# Patient Record
Sex: MALE | Race: WHITE | NOT HISPANIC OR LATINO | Employment: OTHER | URBAN - METROPOLITAN AREA
[De-identification: names, ages, dates, MRNs, and addresses within clinical notes are randomized per-mention and may not be internally consistent; named-entity substitution may affect disease eponyms.]

---

## 2021-01-19 ENCOUNTER — TELEPHONE (OUTPATIENT)
Dept: SCHEDULING | Facility: IMAGING CENTER | Age: 81
End: 2021-01-19

## 2021-01-20 DIAGNOSIS — Z23 NEED FOR VACCINATION: ICD-10-CM

## 2021-01-27 ENCOUNTER — APPOINTMENT (OUTPATIENT)
Dept: FAMILY PLANNING/WOMEN'S HEALTH CLINIC | Facility: IMMUNIZATION CENTER | Age: 81
End: 2021-01-27
Attending: INTERNAL MEDICINE
Payer: MEDICARE

## 2021-01-27 DIAGNOSIS — Z23 NEED FOR VACCINATION: ICD-10-CM

## 2021-01-27 PROCEDURE — 0011A MODERNA SARS-COV-2 VACCINE: CPT | Performed by: INTERNAL MEDICINE

## 2021-01-27 PROCEDURE — 91301 MODERNA SARS-COV-2 VACCINE: CPT | Performed by: INTERNAL MEDICINE

## 2021-01-27 SDOH — ECONOMIC STABILITY: TRANSPORTATION INSECURITY
IN THE PAST 12 MONTHS, HAS THE LACK OF TRANSPORTATION KEPT YOU FROM MEDICAL APPOINTMENTS OR FROM GETTING MEDICATIONS?: NO

## 2021-01-27 SDOH — ECONOMIC STABILITY: INCOME INSECURITY: IN THE LAST 12 MONTHS, WAS THERE A TIME WHEN YOU WERE NOT ABLE TO PAY THE MORTGAGE OR RENT ON TIME?: NO

## 2021-01-27 SDOH — ECONOMIC STABILITY: HOUSING INSECURITY: IN THE LAST 12 MONTHS, HOW MANY PLACES HAVE YOU LIVED?: 1

## 2021-01-27 SDOH — ECONOMIC STABILITY: HOUSING INSECURITY
IN THE LAST 12 MONTHS, WAS THERE A TIME WHEN YOU DID NOT HAVE A STEADY PLACE TO SLEEP OR SLEPT IN A SHELTER (INCLUDING NOW)?: NO

## 2021-01-27 SDOH — HEALTH STABILITY: MENTAL HEALTH
STRESS IS WHEN SOMEONE FEELS TENSE, NERVOUS, ANXIOUS, OR CAN'T SLEEP AT NIGHT BECAUSE THEIR MIND IS TROUBLED. HOW STRESSED ARE YOU?: NOT AT ALL

## 2021-01-27 SDOH — HEALTH STABILITY: PHYSICAL HEALTH: ON AVERAGE, HOW MANY MINUTES DO YOU ENGAGE IN EXERCISE AT THIS LEVEL?: 30 MINUTES

## 2021-01-27 SDOH — ECONOMIC STABILITY: TRANSPORTATION INSECURITY
IN THE PAST 12 MONTHS, HAS LACK OF RELIABLE TRANSPORTATION KEPT YOU FROM MEDICAL APPOINTMENTS, MEETINGS, WORK OR FROM GETTING THINGS NEEDED FOR DAILY LIVING?: NO

## 2021-01-27 SDOH — HEALTH STABILITY: PHYSICAL HEALTH: ON AVERAGE, HOW MANY DAYS PER WEEK DO YOU ENGAGE IN MODERATE TO STRENUOUS EXERCISE (LIKE A BRISK WALK)?: 4 DAYS

## 2021-01-27 ASSESSMENT — SOCIAL DETERMINANTS OF HEALTH (SDOH)
HOW OFTEN DO YOU ATTEND CHURCH OR RELIGIOUS SERVICES?: NEVER
HOW OFTEN DO YOU ATTENT MEETINGS OF THE CLUB OR ORGANIZATION YOU BELONG TO?: MORE THAN 4 TIMES PER YEAR
WITHIN THE PAST 12 MONTHS, THE FOOD YOU BOUGHT JUST DIDN'T LAST AND YOU DIDN'T HAVE MONEY TO GET MORE: NEVER TRUE
HOW OFTEN DO YOU HAVE A DRINK CONTAINING ALCOHOL: 4 OR MORE TIMES A WEEK
IN A TYPICAL WEEK, HOW MANY TIMES DO YOU TALK ON THE PHONE WITH FAMILY, FRIENDS, OR NEIGHBORS?: THREE TIMES A WEEK
HOW OFTEN DO YOU HAVE SIX OR MORE DRINKS ON ONE OCCASION: NEVER
DO YOU BELONG TO ANY CLUBS OR ORGANIZATIONS SUCH AS CHURCH GROUPS UNIONS, FRATERNAL OR ATHLETIC GROUPS, OR SCHOOL GROUPS?: YES
HOW OFTEN DO YOU GET TOGETHER WITH FRIENDS OR RELATIVES?: THREE TIMES A WEEK
HOW MANY DRINKS CONTAINING ALCOHOL DO YOU HAVE ON A TYPICAL DAY WHEN YOU ARE DRINKING: 1 OR 2
WITHIN THE PAST 12 MONTHS, YOU WORRIED THAT YOUR FOOD WOULD RUN OUT BEFORE YOU GOT THE MONEY TO BUY MORE: NEVER TRUE

## 2021-01-28 ENCOUNTER — IMMUNIZATION (OUTPATIENT)
Dept: FAMILY PLANNING/WOMEN'S HEALTH CLINIC | Facility: IMMUNIZATION CENTER | Age: 81
End: 2021-01-28
Payer: MEDICARE

## 2021-01-28 DIAGNOSIS — Z23 ENCOUNTER FOR VACCINATION: Primary | ICD-10-CM

## 2021-02-03 ENCOUNTER — OFFICE VISIT (OUTPATIENT)
Dept: MEDICAL GROUP | Facility: PHYSICIAN GROUP | Age: 81
End: 2021-02-03
Payer: MEDICARE

## 2021-02-03 VITALS
DIASTOLIC BLOOD PRESSURE: 70 MMHG | TEMPERATURE: 98.6 F | OXYGEN SATURATION: 96 % | WEIGHT: 200.2 LBS | HEART RATE: 85 BPM | BODY MASS INDEX: 30.34 KG/M2 | SYSTOLIC BLOOD PRESSURE: 130 MMHG | RESPIRATION RATE: 16 BRPM | HEIGHT: 68 IN

## 2021-02-03 DIAGNOSIS — E78.5 HYPERLIPIDEMIA LDL GOAL <100: ICD-10-CM

## 2021-02-03 DIAGNOSIS — I48.91 ATRIAL FIBRILLATION, UNSPECIFIED TYPE (HCC): ICD-10-CM

## 2021-02-03 DIAGNOSIS — C61 MALIGNANT NEOPLASM OF PROSTATE (HCC): ICD-10-CM

## 2021-02-03 DIAGNOSIS — Z12.12 SCREENING FOR COLORECTAL CANCER: ICD-10-CM

## 2021-02-03 DIAGNOSIS — J67.2 PNEUMONITIS, HYPERSENSITIVITY, AVIAN (HCC): ICD-10-CM

## 2021-02-03 DIAGNOSIS — J84.9 ILD (INTERSTITIAL LUNG DISEASE) (HCC): ICD-10-CM

## 2021-02-03 DIAGNOSIS — Z00.00 ANNUAL PHYSICAL EXAM: ICD-10-CM

## 2021-02-03 DIAGNOSIS — I10 BENIGN ESSENTIAL HYPERTENSION: ICD-10-CM

## 2021-02-03 DIAGNOSIS — Z12.11 SCREENING FOR COLORECTAL CANCER: ICD-10-CM

## 2021-02-03 PROCEDURE — 99204 OFFICE O/P NEW MOD 45 MIN: CPT | Performed by: NURSE PRACTITIONER

## 2021-02-03 RX ORDER — HYDROCHLOROTHIAZIDE 25 MG/1
25 TABLET ORAL DAILY
COMMUNITY
Start: 2020-12-24

## 2021-02-03 RX ORDER — ACETAMINOPHEN 325 MG/1
325 TABLET ORAL
COMMUNITY

## 2021-02-03 RX ORDER — MYCOPHENOLATE MOFETIL 500 MG/1
TABLET ORAL
COMMUNITY
Start: 2020-08-31 | End: 2021-02-03

## 2021-02-03 RX ORDER — LOSARTAN POTASSIUM 100 MG/1
100 TABLET ORAL DAILY
COMMUNITY
Start: 2020-12-09

## 2021-02-03 RX ORDER — OMEPRAZOLE 20 MG/1
20 TABLET, DELAYED RELEASE ORAL DAILY
COMMUNITY
Start: 2020-08-31 | End: 2021-02-03

## 2021-02-03 RX ORDER — SIMVASTATIN 40 MG
40 TABLET ORAL
COMMUNITY
Start: 2020-12-27 | End: 2021-07-06

## 2021-02-03 RX ORDER — MYCOPHENOLATE MOFETIL 500 MG/1
TABLET ORAL
COMMUNITY
Start: 2020-12-27 | End: 2021-07-06

## 2021-02-03 ASSESSMENT — ENCOUNTER SYMPTOMS
MUSCULOSKELETAL NEGATIVE: 1
CLAUDICATION: 0
RESPIRATORY NEGATIVE: 1
SORE THROAT: 0
PSYCHIATRIC NEGATIVE: 1
NEUROLOGICAL NEGATIVE: 1
GASTROINTESTINAL NEGATIVE: 1
CONSTITUTIONAL NEGATIVE: 1
PALPITATIONS: 1
EYES NEGATIVE: 1

## 2021-02-03 ASSESSMENT — PATIENT HEALTH QUESTIONNAIRE - PHQ9: CLINICAL INTERPRETATION OF PHQ2 SCORE: 0

## 2021-02-03 NOTE — ASSESSMENT & PLAN NOTE
Chronic stable condition.  He currently is taking simvastatin 40 mg at every bedtime.  He denies any issues with myalgias.  He is being followed by Dr. Reese at [facility] in [place].

## 2021-02-03 NOTE — ASSESSMENT & PLAN NOTE
Chronic but stable.    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  He has since then been diagnosed with interstitial lung disease and has seen a pulmonologist in [facility].

## 2021-02-03 NOTE — ASSESSMENT & PLAN NOTE
This is a chronic stable patient.    Patient was diagnosed 6 years ago when he was moving his barbecue upstairs and he noticed that his heart beat came funky with his beats.  He then went to the hospital and was reported to be in A. fib they were able to get this under control with medications.  He denies any medications for 1 to 2 years then states he began having similar symptoms.    While he was living in [place] he was seen by cardiology where they attempted different medications however over time he has had side effects to certain one such as amiodarone.  They did an ablation in 2014 at [facility] in [place].  He denies any current issues such as shortness of breath, dizziness, palpitations.  He is currently using Eliquis at this time his cardiologist Dr. Reese at [facility] follows.

## 2021-02-03 NOTE — ASSESSMENT & PLAN NOTE
Follow with pulmonary at [facility] [[email or web address]   increaeed SBO with exhertino and started using portable o2 and concentrator at home. doesnot need it all the itme,   Maintains 90-93% onRA resting  Uses at night    Uses 3 liters out and about  At home 2 Lcn

## 2021-02-03 NOTE — PROGRESS NOTES
Chief Complaint   Patient presents with   • [facility] [place] [facility]      Subjective:     Pablo Kraus is a 80 y.o. male presenting to establish care and:    ILD (interstitial lung disease) (HCC)  This is a chronic stable condition.    Patient believes he was diagnosed back in 2000 by Dr. Anderson at the time of [facility].  He has been stable for many years however in the last year it seems to rapidly have progressed he was referred out to [facility] where he sees Toya Neal MD.   Will be following up with [facility] on March 1 for a chest CT and pulmonary function test.  He has had multiple imaging studies for this.    He recently in the last year was prescribed oxygen which he does currently use 2 to 3 L nasal cannula portable oxygen which she currently has done in the office.  He also has a concentrator at the house that he uses long tubing on.  At rest he normally is on room air 93% so he does not always wear his oxygen.    He is currently using mycophenolate daily which is being managed by his pulmonologist at [facility].    Malignant neoplasm of prostate (HCC)  This is resolved.    He had his prostate removed back in 2000 he does get occasional PSA checks.  He denies any current issues     Pneumonitis, hypersensitivity, sheela (HCC)  Chronic but stable.    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  He has since then been diagnosed with interstitial lung disease and has seen a pulmonologist in [facility].     Atrial fibrillation (HCC)  This is a chronic stable patient.    Patient was diagnosed 6 years ago when he was moving his barbecue upstairs and he noticed that his heart beat came funky with his beats.  He then went to the hospital and was reported to be in A. fib they were able to get this under control with medications.  He denies any medications for 1 to 2 years then states he began having similar  symptoms.    While he was living in [place] he was seen by cardiology where they attempted different medications however over time he has had side effects to certain one such as amiodarone.  They did an ablation in 2014 at [facility] in [place].  He denies any current issues such as shortness of breath, dizziness, palpitations.  He is currently using Eliquis at this time his cardiologist Dr. Reese at [facility] follows.       Benign essential hypertension  This is a chronic and stable condition.    He currently is on hydrochlorothiazide and losartan.  He originally was on amlodipine however that gave him side effects.  He is being followed by Dr. June cardiology at [facility] in [place].  Today's blood pressure was 130/70.  Patient denies any increase in headaches or dizziness, chest pain or chest palpitations.      Hyperlipidemia LDL goal <100  Chronic stable condition.  He currently is taking simvastatin 40 mg at every bedtime.  He denies any issues with myalgias.  He is being followed by Dr. Reese at [facility] in [place].        Review of Systems   Constitutional: Negative.    HENT: Positive for nosebleeds. Negative for congestion, ear pain and sore throat.         Occasional nose bleeds but using eliquis    Eyes: Negative.    Respiratory: Negative.         On home oxygen 2-3 liter nc protable O2   Cardiovascular: Positive for palpitations and leg swelling. Negative for chest pain and claudication.        Slight swelling but baseline  occasional palpations-baseline for his afib   Gastrointestinal: Negative.    Genitourinary: Negative.    Musculoskeletal: Negative.    Skin: Negative.    Neurological: Negative.    Psychiatric/Behavioral: Negative.         Review of systems:  See above.   Denies any symptoms unless previously indicated.        Current Outpatient Medications:   •  hydroCHLOROthiazide (HYDRODIURIL) 25 MG Tab, Take 25 mg by mouth every day.,  "Disp: , Rfl:   •  apixaban (ELIQUIS) 5mg Tab, Take 5 mg by mouth., Disp: , Rfl:   •  losartan (COZAAR) 100 MG Tab, Take 100 mg by mouth every day., Disp: , Rfl:   •  simvastatin (ZOCOR) 40 MG Tab, Take 40 mg by mouth every bedtime., Disp: , Rfl:   •  mycophenolate (CELLCEPT) 500 MG tablet, TAKE 1 TABLET BY MOUTH TWICE DAILY FOR 2 WEEKS THEN TAKE 2 TABLETS TWICE DAILY, Disp: , Rfl:   •  vitamin E 180 mg (400 units) Cap, Take 180 mg by mouth every day., Disp: , Rfl:   •  acetaminophen (TYLENOL) 325 MG Tab, Take 325 mg by mouth., Disp: , Rfl:     Past Medical History:   Diagnosis Date   • Other dyspnea and respiratory abnormality 12/16/2010       Past Surgical History:   Procedure Laterality Date   • APPENDECTOMY     • PROSTATECTOMY, RADICAL RETRO     • TONSILLECTOMY         History reviewed. No pertinent family history.    Patient has no known allergies.    Allergies, past medical history, past surgical history, family history, social history reviewed and updated    Objective:     Vitals: /70 (BP Location: Left arm, Patient Position: Sitting, BP Cuff Size: Adult)   Pulse 85   Temp 37 °C (98.6 °F) (Temporal)   Resp 16   Ht 1.727 m (5' 8\")   Wt 90.8 kg (200 lb 3.2 oz)   SpO2 96%   BMI 30.44 kg/m²   General: Alert, pleasant, NAD  EYES:   PERRL, EOMI, no icterus or pallor.  Conjunctivae and lids normal.   HENT:  Normocephalic.  External ears normal. Tympanic membranes pearly, opaque.  No nasal drainage present.  Oropharynx non-erythematous, mucous membranes moist.  Neck supple.   No thyromegaly or masses palpated. No cervical or supraclavicular lymphadenopathy.  Heart: Regular rate and rhythm.  S1 and S2 normal.  No murmurs appreciated.  Respiratory: On a portable oxygenation device at 3 L nasal cannula.  Normal respiratory effort.  Clear to auscultation bilaterally.  Abdomen: Non-distended, soft, non-tender, no guarding/rebound. Bowel sounds present.  No hepatosplenomegaly.  No hernias.  Skin: Warm, dry, no " rashes.  Musculoskeletal: Gait is normal.  Moves all extremities well.    Extremities: No cyanosis or clubbing noted.  1+ pitting edema to bilateral lower extremities.   Neurological: No tremors, sensation grossly intact, tone/strength normal, gait is normal, CN2-12 intact.  Psych:  Affect/mood is normal, judgement is good, memory is intact, grooming is appropriate.    Assessment/Plan:   1. Annual physical exam  This 80-year-old male who is establishing care today.  He does have multiple specialists.  He has a cardiologist up in the [place] at [facility], as well as a pulmonologist Dr. Anderson and another pulmonologist at [facility].     2. ILD (interstitial lung disease) (HCC)  He is being followed by Dr. Emanuel Kimble at [facility]  He will be seeing them for a PFT March 1.  3. Pneumonitis, hypersensitivity, sheela (HCC)    4. Atrial fibrillation, unspecified type (HCC)  Being seen by Dr. June at [facility] in [place].  5. Benign essential hypertension  6. Hyperlipidemia LDL goal <100      7. Malignant neoplasm of prostate (HCC)  Had a prostatectomy in 2000 per patient.  Occasionally gets PSAs did not request one today.    8. Screening for colorectal cancer  - REFERRAL TO GI FOR COLONOSCOPY  Even the patient has aged out of this he is requesting to get another colonoscopy.    Discussed with patient possible alternative diagnoses.       Reviewed risks and benefits of treatment plan. Patient verbalizes understanding of all instruction and verbally agrees to plan.      Return in about 3 months (around 5/3/2021) for follow up on colonoscopy.    My total time spent caring for the patient on the day of the encounter was 50 minutes.   This does not include time spent on separately billable procedures/tests.    Please note that this dictation was created using voice recognition software. I have made every reasonable attempt to correct obvious errors, but I expect that there are errors of grammar and possibly content that  I did not discover before finalizing the note.

## 2021-02-03 NOTE — PATIENT INSTRUCTIONS
1.  Orders for colonoscopy.  If you do not hear from them in the next 3-5 business days please reach out to me through [facility].

## 2021-02-03 NOTE — ASSESSMENT & PLAN NOTE
This is a chronic and stable condition.    He currently is on hydrochlorothiazide and losartan.  He originally was on amlodipine however that gave him side effects.  He is being followed by Dr. June cardiology at [facility] in [place].  Today's blood pressure was 130/70.  Patient denies any increase in headaches or dizziness, chest pain or chest palpitations.

## 2021-02-03 NOTE — ASSESSMENT & PLAN NOTE
This is resolved.    He had his prostate removed back in 2000 he does get occasional PSA checks.  He denies any current issues

## 2021-02-03 NOTE — ASSESSMENT & PLAN NOTE
This is a chronic stable condition.    Patient believes he was diagnosed back in 2000 by Dr. Anderson at the time of [facility].  He has been stable for many years however in the last year it seems to rapidly have progressed he was referred out to [facility] where he sees Toya Neal MD.   Will be following up with [facility] on March 1 for a chest CT and pulmonary function test.  He has had multiple imaging studies for this.    He recently in the last year was prescribed oxygen which he does currently use 2 to 3 L nasal cannula portable oxygen which she currently has done in the office.  He also has a concentrator at the house that he uses long tubing on.  At rest he normally is on room air 93% so he does not always wear his oxygen.    He is currently using mycophenolate daily which is being managed by his pulmonologist at [facility].

## 2021-02-25 ENCOUNTER — IMMUNIZATION (OUTPATIENT)
Dept: FAMILY PLANNING/WOMEN'S HEALTH CLINIC | Facility: IMMUNIZATION CENTER | Age: 81
End: 2021-02-25
Attending: INTERNAL MEDICINE
Payer: MEDICARE

## 2021-02-25 DIAGNOSIS — Z23 ENCOUNTER FOR VACCINATION: Primary | ICD-10-CM

## 2021-02-25 PROCEDURE — 91301 MODERNA SARS-COV-2 VACCINE: CPT | Performed by: INTERNAL MEDICINE

## 2021-02-25 PROCEDURE — 0012A MODERNA SARS-COV-2 VACCINE: CPT | Performed by: INTERNAL MEDICINE

## 2021-03-30 ENCOUNTER — HOSPITAL ENCOUNTER (OUTPATIENT)
Dept: LAB | Facility: MEDICAL CENTER | Age: 81
End: 2021-03-30
Attending: PHYSICIAN ASSISTANT
Payer: MEDICARE

## 2021-03-30 LAB
ANION GAP SERPL CALC-SCNC: 8 MMOL/L (ref 7–16)
BASOPHILS # BLD AUTO: 0.6 % (ref 0–1.8)
BASOPHILS # BLD: 0.04 K/UL (ref 0–0.12)
BUN SERPL-MCNC: 16 MG/DL (ref 8–22)
CALCIUM SERPL-MCNC: 9.6 MG/DL (ref 8.4–10.2)
CHLORIDE SERPL-SCNC: 85 MMOL/L (ref 96–112)
CO2 SERPL-SCNC: 30 MMOL/L (ref 20–33)
CREAT SERPL-MCNC: 0.85 MG/DL (ref 0.5–1.4)
EOSINOPHIL # BLD AUTO: 0.07 K/UL (ref 0–0.51)
EOSINOPHIL NFR BLD: 1 % (ref 0–6.9)
ERYTHROCYTE [DISTWIDTH] IN BLOOD BY AUTOMATED COUNT: 41.4 FL (ref 35.9–50)
GLUCOSE SERPL-MCNC: 145 MG/DL (ref 65–99)
HCT VFR BLD AUTO: 43.2 % (ref 42–52)
HGB BLD-MCNC: 14.4 G/DL (ref 14–18)
IMM GRANULOCYTES # BLD AUTO: 0.03 K/UL (ref 0–0.11)
IMM GRANULOCYTES NFR BLD AUTO: 0.4 % (ref 0–0.9)
INR PPP: 1.09 (ref 0.87–1.13)
LYMPHOCYTES # BLD AUTO: 1 K/UL (ref 1–4.8)
LYMPHOCYTES NFR BLD: 14.1 % (ref 22–41)
MCH RBC QN AUTO: 30.5 PG (ref 27–33)
MCHC RBC AUTO-ENTMCNC: 33.3 G/DL (ref 33.7–35.3)
MCV RBC AUTO: 91.5 FL (ref 81.4–97.8)
MONOCYTES # BLD AUTO: 0.59 K/UL (ref 0–0.85)
MONOCYTES NFR BLD AUTO: 8.3 % (ref 0–13.4)
NEUTROPHILS # BLD AUTO: 5.37 K/UL (ref 1.82–7.42)
NEUTROPHILS NFR BLD: 75.6 % (ref 44–72)
NRBC # BLD AUTO: 0 K/UL
NRBC BLD-RTO: 0 /100 WBC
PLATELET # BLD AUTO: 215 K/UL (ref 164–446)
PMV BLD AUTO: 9.5 FL (ref 9–12.9)
POTASSIUM SERPL-SCNC: 4.4 MMOL/L (ref 3.6–5.5)
PROTHROMBIN TIME: 13.8 SEC (ref 12–14.6)
RBC # BLD AUTO: 4.72 M/UL (ref 4.7–6.1)
SODIUM SERPL-SCNC: 123 MMOL/L (ref 135–145)
WBC # BLD AUTO: 7.1 K/UL (ref 4.8–10.8)

## 2021-03-30 PROCEDURE — 85610 PROTHROMBIN TIME: CPT | Mod: GA

## 2021-03-30 PROCEDURE — 80048 BASIC METABOLIC PNL TOTAL CA: CPT

## 2021-03-30 PROCEDURE — 85025 COMPLETE CBC W/AUTO DIFF WBC: CPT

## 2021-03-30 PROCEDURE — 36415 COLL VENOUS BLD VENIPUNCTURE: CPT

## 2021-04-20 ENCOUNTER — PRE-ADMISSION TESTING (OUTPATIENT)
Dept: ADMISSIONS | Facility: MEDICAL CENTER | Age: 81
End: 2021-04-20
Attending: INTERNAL MEDICINE
Payer: MEDICARE

## 2021-04-20 DIAGNOSIS — Z01.810 PRE-OPERATIVE CARDIOVASCULAR EXAMINATION: ICD-10-CM

## 2021-04-20 DIAGNOSIS — Z01.812 PRE-OPERATIVE LABORATORY EXAMINATION: ICD-10-CM

## 2021-04-20 LAB
ANION GAP SERPL CALC-SCNC: 10 MMOL/L (ref 7–16)
BUN SERPL-MCNC: 14 MG/DL (ref 8–22)
CALCIUM SERPL-MCNC: 10 MG/DL (ref 8.5–10.5)
CHLORIDE SERPL-SCNC: 86 MMOL/L (ref 96–112)
CO2 SERPL-SCNC: 33 MMOL/L (ref 20–33)
CREAT SERPL-MCNC: 0.74 MG/DL (ref 0.5–1.4)
EKG IMPRESSION: NORMAL
GLUCOSE SERPL-MCNC: 85 MG/DL (ref 65–99)
POTASSIUM SERPL-SCNC: 4.4 MMOL/L (ref 3.6–5.5)
SARS-COV-2 RNA RESP QL NAA+PROBE: NOTDETECTED
SODIUM SERPL-SCNC: 129 MMOL/L (ref 135–145)
SPECIMEN SOURCE: NORMAL

## 2021-04-20 PROCEDURE — 36415 COLL VENOUS BLD VENIPUNCTURE: CPT

## 2021-04-20 PROCEDURE — 93010 ELECTROCARDIOGRAM REPORT: CPT | Performed by: INTERNAL MEDICINE

## 2021-04-20 PROCEDURE — U0005 INFEC AGEN DETEC AMPLI PROBE: HCPCS

## 2021-04-20 PROCEDURE — C9803 HOPD COVID-19 SPEC COLLECT: HCPCS

## 2021-04-20 PROCEDURE — 80048 BASIC METABOLIC PNL TOTAL CA: CPT

## 2021-04-20 PROCEDURE — 93005 ELECTROCARDIOGRAM TRACING: CPT

## 2021-04-20 PROCEDURE — U0003 INFECTIOUS AGENT DETECTION BY NUCLEIC ACID (DNA OR RNA); SEVERE ACUTE RESPIRATORY SYNDROME CORONAVIRUS 2 (SARS-COV-2) (CORONAVIRUS DISEASE [COVID-19]), AMPLIFIED PROBE TECHNIQUE, MAKING USE OF HIGH THROUGHPUT TECHNOLOGIES AS DESCRIBED BY CMS-2020-01-R: HCPCS

## 2021-04-27 ENCOUNTER — ANESTHESIA (OUTPATIENT)
Dept: SURGERY | Facility: MEDICAL CENTER | Age: 81
End: 2021-04-27
Payer: MEDICARE

## 2021-04-27 ENCOUNTER — ANESTHESIA EVENT (OUTPATIENT)
Dept: SURGERY | Facility: MEDICAL CENTER | Age: 81
End: 2021-04-27
Payer: MEDICARE

## 2021-04-27 ENCOUNTER — HOSPITAL ENCOUNTER (OUTPATIENT)
Facility: MEDICAL CENTER | Age: 81
End: 2021-04-27
Attending: INTERNAL MEDICINE | Admitting: INTERNAL MEDICINE
Payer: MEDICARE

## 2021-04-27 VITALS
SYSTOLIC BLOOD PRESSURE: 121 MMHG | RESPIRATION RATE: 16 BRPM | HEART RATE: 56 BPM | WEIGHT: 189.6 LBS | OXYGEN SATURATION: 97 % | DIASTOLIC BLOOD PRESSURE: 72 MMHG | TEMPERATURE: 97 F | BODY MASS INDEX: 28.73 KG/M2 | HEIGHT: 68 IN

## 2021-04-27 LAB — PATHOLOGY CONSULT NOTE: NORMAL

## 2021-04-27 PROCEDURE — 700105 HCHG RX REV CODE 258: Performed by: INTERNAL MEDICINE

## 2021-04-27 PROCEDURE — 160002 HCHG RECOVERY MINUTES (STAT): Performed by: INTERNAL MEDICINE

## 2021-04-27 PROCEDURE — 160025 RECOVERY II MINUTES (STATS): Performed by: INTERNAL MEDICINE

## 2021-04-27 PROCEDURE — 160035 HCHG PACU - 1ST 60 MINS PHASE I: Performed by: INTERNAL MEDICINE

## 2021-04-27 PROCEDURE — 160203 HCHG ENDO MINUTES - 1ST 30 MINS LEVEL 4: Performed by: INTERNAL MEDICINE

## 2021-04-27 PROCEDURE — 501629 HCHG TUBE, LUKI TRAP STERILE DISP: Performed by: INTERNAL MEDICINE

## 2021-04-27 PROCEDURE — 160046 HCHG PACU - 1ST 60 MINS PHASE II: Performed by: INTERNAL MEDICINE

## 2021-04-27 PROCEDURE — 160009 HCHG ANES TIME/MIN: Performed by: INTERNAL MEDICINE

## 2021-04-27 PROCEDURE — 700101 HCHG RX REV CODE 250: Performed by: INTERNAL MEDICINE

## 2021-04-27 PROCEDURE — 88305 TISSUE EXAM BY PATHOLOGIST: CPT

## 2021-04-27 PROCEDURE — A9270 NON-COVERED ITEM OR SERVICE: HCPCS | Performed by: INTERNAL MEDICINE

## 2021-04-27 PROCEDURE — 160048 HCHG OR STATISTICAL LEVEL 1-5: Performed by: INTERNAL MEDICINE

## 2021-04-27 PROCEDURE — 700111 HCHG RX REV CODE 636 W/ 250 OVERRIDE (IP): Performed by: ANESTHESIOLOGY

## 2021-04-27 RX ORDER — ONDANSETRON 2 MG/ML
4 INJECTION INTRAMUSCULAR; INTRAVENOUS
Status: DISCONTINUED | OUTPATIENT
Start: 2021-04-27 | End: 2021-04-27 | Stop reason: HOSPADM

## 2021-04-27 RX ORDER — DIPHENHYDRAMINE HYDROCHLORIDE 50 MG/ML
12.5 INJECTION INTRAMUSCULAR; INTRAVENOUS
Status: DISCONTINUED | OUTPATIENT
Start: 2021-04-27 | End: 2021-04-27 | Stop reason: HOSPADM

## 2021-04-27 RX ORDER — SODIUM CHLORIDE, SODIUM LACTATE, POTASSIUM CHLORIDE, CALCIUM CHLORIDE 600; 310; 30; 20 MG/100ML; MG/100ML; MG/100ML; MG/100ML
INJECTION, SOLUTION INTRAVENOUS CONTINUOUS
Status: DISCONTINUED | OUTPATIENT
Start: 2021-04-27 | End: 2021-04-27 | Stop reason: HOSPADM

## 2021-04-27 RX ORDER — FUROSEMIDE 20 MG/1
20 TABLET ORAL DAILY
COMMUNITY
End: 2021-07-06

## 2021-04-27 RX ORDER — POTASSIUM CHLORIDE 1.5 G/1.58G
10 POWDER, FOR SOLUTION ORAL DAILY
COMMUNITY
End: 2021-07-06

## 2021-04-27 RX ORDER — HALOPERIDOL 5 MG/ML
1 INJECTION INTRAMUSCULAR
Status: DISCONTINUED | OUTPATIENT
Start: 2021-04-27 | End: 2021-04-27 | Stop reason: HOSPADM

## 2021-04-27 RX ADMIN — SODIUM CHLORIDE, POTASSIUM CHLORIDE, SODIUM LACTATE AND CALCIUM CHLORIDE: 600; 310; 30; 20 INJECTION, SOLUTION INTRAVENOUS at 11:42

## 2021-04-27 RX ADMIN — PROPOFOL 100 MG: 10 INJECTION, EMULSION INTRAVENOUS at 11:49

## 2021-04-27 RX ADMIN — POVIDONE IODINE 15 ML: 100 SOLUTION TOPICAL at 11:02

## 2021-04-27 RX ADMIN — PROPOFOL 20 MG: 10 INJECTION, EMULSION INTRAVENOUS at 11:53

## 2021-04-27 ASSESSMENT — PAIN DESCRIPTION - PAIN TYPE: TYPE: SURGICAL PAIN

## 2021-04-27 ASSESSMENT — PAIN SCALES - GENERAL: PAIN_LEVEL: 0

## 2021-04-27 NOTE — OR NURSING
1240 Received pt from Rhoda CARTWRIGHT, assumed care of pt at this time. Pt sitting in recliner chair. Pt on continuous O2, pt.'s tank and belongings brought to bedside.     1245 Pt.'s Wife brought to bedside, Dr. Negron spoke with pt and Wife, all questions answered.     1255 Pt meets criteria for discharge home, pt dressing with assistance from Wife. Hand-off to Pamela CARTWRIGHT.

## 2021-04-27 NOTE — ANESTHESIA POSTPROCEDURE EVALUATION
Patient: Pablo Kraus    Procedure Summary     Date: 04/27/21 Room / Location: [facility] ROOM 26 / SURGERY SAME DAY [facility]    Anesthesia Start: 1142 Anesthesia Stop: 1211    Procedure: COLONOSCOPY (N/A Anus) Diagnosis: (DIVERTICULOSIS,COLON POLYPS,HEMORRHOIDS)    Surgeons: Lalit Negron M.D. Responsible Provider: Tyree Mancini M.D.    Anesthesia Type: general ASA Status: 2          Final Anesthesia Type: general  Last vitals  BP   Blood Pressure : 151/98    Temp   36.8 °C (98.2 °F)    Pulse   85   Resp   18    SpO2   96 %      Anesthesia Post Evaluation    Patient location during evaluation: PACU  Patient participation: complete - patient participated  Level of consciousness: awake and alert  Pain score: 0    Airway patency: patent  Anesthetic complications: no  Cardiovascular status: hemodynamically stable  Respiratory status: acceptable  Hydration status: euvolemic    PONV: none          No complications documented.     Nurse Pain Score: 0 (NPRS)

## 2021-04-27 NOTE — PROCEDURES
DATE OF PROCEDURE:  04/27/2021     PROCEDURE PERFORMED:  Colonoscopy with cold snare polypectomy.     INDICATIONS:  History of colon polyps, surveillance/screening.     INSTRUMENT UTILIZED:  Olympus pediatric variable stiffness flexible forward   viewing colonoscope.     CONSENT:  RBA Discussion held.  Risks including but not be limited to,   bleeding, complications of sedation, and perforation were discussed.     ANESTHESIOLOGIST:  Sedation/anesthesia provided by Tyree Mancini MD of   anesthesiology.     TOLERANCE:  Excellent.     LAXATIVE PREPARATION:  MoviPrep.     QUALITY OF PREPARATION:  Good.     PATHOLOGY SPECIMENS:    A.  Transverse colon polyp.  B.  Ascending colon polyp.     COLONOSCOPE WITHDRAWAL TIME:  13 minutes.     PROCEDURAL DETAIL:  After adequate sedation, the procedure was begun.    External anal exam revealed small external hemorrhoids.  Digital rectal exam   revealed normal resting anal sphincter tone and no palpable rectal masses.    The instrument was advanced per the anal canal into the rectal vault.  The   instrument was carefully advanced to the cecum, which was identified by the   ileocecal valve and appendiceal orifice.  Photodocumentation was obtained.    Upon withdrawal of the colonoscope, the mucosa was carefully inspected to the   best of our ability.  In the ascending colon approximately 80 cm from the anal   verge, there was a 3 mm sessile polyp, which was removed using cold snare   technique.  In the transverse colon approximately 65 cm from the anal verge,   there was a 3 mm sessile polyp, which was removed using cold snare technique   and retrieved.  In the descending and sigmoid colon, there was moderate   diverticulosis without diverticulitis.  Once in the rectum, retroflexion was   completed, which revealed grade II nonbleeding internal hemorrhoids.     No immediate complications.     IMPRESSIONS AND FINDINGS:   1.  Colon polyps x2, status post removal and retrieval.  2.   Diverticular disease of the left colon.  3.  Hemorrhoids.     PLAN AND RECOMMENDATIONS:    1.  Observe for any adverse events from this procedure.  2.  I will contact the patient in 2 weeks with pathology results.  3.  Okay to resume Eliquis tonight.  4.  High fiber diet and avoid straining the stool.  5.  Future surveillance interval depends upon pathology findings.        ______________________________  MD ALTON SMITH/SARAH/CHELI    DD:  04/27/2021 12:40  DT:  04/27/2021 13:15    Job#:  [id number]    CC:CRISTIN Jane(User)  MD ISABELLE OCAMPO MD

## 2021-04-27 NOTE — DISCHARGE INSTRUCTIONS
ACTIVITY: Rest and take it easy for the first 24 hours.  A responsible adult is recommended to remain with you during that time.  It is normal to feel sleepy.  We encourage you to not do anything that requires balance, judgment or coordination.    MILD FLU-LIKE SYMPTOMS ARE NORMAL. YOU MAY EXPERIENCE GENERALIZED MUSCLE ACHES, THROAT IRRITATION, HEADACHE AND/OR SOME NAUSEA.    FOR 24 HOURS DO NOT:  Drive, operate machinery or run household appliances.  Drink beer or alcoholic beverages.   Make important decisions or sign legal documents.    SPECIAL INSTRUCTIONS: I will send a path letter in two weeks regarding the small polyps   Okay to resume eliquis tonight   High fiber diet  Colonoscopy, Adult, Care After  This sheet gives you information about how to care for yourself after your procedure. Your doctor may also give you more specific instructions. If you have problems or questions, call your doctor.  What can I expect after the procedure?  After the procedure, it is common to have:  · A small amount of blood in your poop for 24 hours.  · Some gas.  · Mild cramping or bloating in your belly.  Follow these instructions at home:  General instructions  · For the first 24 hours after the procedure:  ? Do not drive or use machinery.  ? Do not sign important documents.  ? Do not drink alcohol.  ? Do your daily activities more slowly than normal.  ? Eat foods that are soft and easy to digest.  · Take over-the-counter or prescription medicines only as told by your doctor.  To help cramping and bloating:    · Try walking around.  · Put heat on your belly (abdomen) as told by your doctor. Use a heat source that your doctor recommends, such as a moist heat pack or a heating pad.  ? Put a towel between your skin and the heat source.  ? Leave the heat on for 20-30 minutes.  ? Remove the heat if your skin turns bright red. This is especially important if you cannot feel pain, heat, or cold. You can get burned.  Eating and  drinking    · Drink enough fluid to keep your pee (urine) clear or pale yellow.  · Return to your normal diet as told by your doctor. Avoid heavy or fried foods that are hard to digest.  · Avoid drinking alcohol for as long as told by your doctor.  Contact a doctor if:  · You have blood in your poop (stool) 2-3 days after the procedure.  Get help right away if:  · You have more than a small amount of blood in your poop.  · You see large clumps of tissue (blood clots) in your poop.  · Your belly is swollen.  · You feel sick to your stomach (nauseous).  · You throw up (vomit).  · You have a fever.  · You have belly pain that gets worse, and medicine does not help your pain.  Summary  · After the procedure, it is common to have a small amount of blood in your poop. You may also have mild cramping and bloating in your belly.  · For the first 24 hours after the procedure, do not drive or use machinery, do not sign important documents, and do not drink alcohol.  · Get help right away if you have a lot of blood in your poop, feel sick to your stomach, have a fever, or have more belly pain.  This information is not intended to replace advice given to you by your health care provider. Make sure you discuss any questions you have with your health care provider.  Document Released: 01/20/2012 Document Revised: 10/18/2018 Document Reviewed: 09/11/2017  [employer] Patient Education © 2020 [employer]      DIET: To avoid nausea, slowly advance diet as tolerated, avoiding spicy or greasy foods for the first day.  Add more substantial food to your diet according to your physician's instructions.  Babies can be fed formula or breast milk as soon as they are hungry.  INCREASE FLUIDS AND FIBER TO AVOID CONSTIPATION.    FOLLOW-UP APPOINTMENT:  A follow-up appointment should be arranged with your doctor in, call to schedule.    You should CALL YOUR PHYSICIAN if you develop:  Fever greater than 101 degrees F.  Pain not relieved by  medication, or persistent nausea or vomiting.  Excessive bleeding (blood soaking through dressing) or unexpected drainage from the wound.  Extreme redness or swelling around the incision site, drainage of pus or foul smelling drainage.  Inability to urinate or empty your bladder within 8 hours.  Problems with breathing or chest pain.    You should call 911 if you develop problems with breathing or chest pain.  If you are unable to contact your doctor or surgical center, you should go to the nearest emergency room or urgent care center.  Physician's telephone #: Dr. Negron [phone]    If any questions arise, call your doctor.  If your doctor is not available, please feel free to call the Surgical Center at [phone]. The Contact Center is open Monday through Friday 7AM to 5PM and may speak to a nurse at [phone], or toll free at [phone].     A registered nurse may call you a few days after your surgery to see how you are doing after your procedure.    MEDICATIONS: Resume taking daily medication.  Take prescribed pain medication with food.  If no medication is prescribed, you may take non-aspirin pain medication if needed.  PAIN MEDICATION CAN BE VERY CONSTIPATING.  Take a stool softener or laxative such as senokot, pericolace, or milk of magnesia if needed.    If your physician has prescribed pain medication that includes Acetaminophen (Tylenol), do not take additional Acetaminophen (Tylenol) while taking the prescribed medication.    Depression / Suicide Risk    As you are discharged from this [facility] facility, it is important to learn how to keep safe from harming yourself.    Recognize the warning signs:  · Abrupt changes in personality, positive or negative- including increase in energy   · Giving away possessions  · Change in eating patterns- significant weight changes-  positive or negative  · Change in sleeping patterns- unable to sleep or sleeping all the time   · Unwillingness or  inability to communicate  · Depression  · Unusual sadness, discouragement and loneliness  · Talk of wanting to die  · Neglect of personal appearance   · Rebelliousness- reckless behavior  · Withdrawal from people/activities they love  · Confusion- inability to concentrate     If you or a loved one observes any of these behaviors or has concerns about self-harm, here's what you can do:  · Talk about it- your feelings and reasons for harming yourself  · Remove any means that you might use to hurt yourself (examples: pills, rope, extension cords, firearm)  · Get professional help from the community (Mental Health, Substance Abuse, psychological counseling)  · Do not be alone:Call your Safe Contact- someone whom you trust who will be there for you.  · Call your local CRISIS HOTLINE 542-9220 or 900-502-6335  · Call your local Children's Mobile Crisis Response Team [place] (520) 852-9221 or www."[employer]"  · Call the toll free National Suicide Prevention Hotlines   · National Suicide Prevention Lifeline 554-688-BRWH (8372)  · National Hope Line Network 800-SUICIDE (609-8445)

## 2021-04-27 NOTE — OR SURGEON
Immediate Post OP Note    PreOp Diagnosis: history of colon polyps      PostOp Diagnosis: Colon polyps x 2, diverticular disease of colon, internal hemorrhoids       Procedure(s):  COLONOSCOPY - Wound Class: Clean Contaminated    Surgeon(s):  Lalit Negron M.D.    Anesthesiologist/Type of Anesthesia:  Anesthesiologist: Tyree Mancini M.D./General    Surgical Staff:  Endoscopy Technician: Edelmira Royal  Endoscopy Nurse: Kya Lundy R.N.; Nany Vidales; Yaniv Frankel RMARTIR    Specimens removed if any:  ID Type Source Tests Collected by Time Destination   A :  Polyp Colon - Transverse PATHOLOGY SPECIMEN Lalit Negron M.D. 4/27/2021 11:53 AM    B :  Polyp Colon - Ascending PATHOLOGY SPECIMEN Lalit Negron M.D. 4/27/2021 12:01 PM        Estimated Blood Loss: < 5 cc    Findings: Two small sessile polyps removed using cold snare technique, diverticular disease of left colon, internal hemorrhoids    Complications: no immediate    Plan/Recommendations:  1)see dictated note for details  2)I will contact patient with path in one to two weeks  3)Okay to resume eliquis tonight.        4/27/2021 12:15 PM Lalit Negron M.D.

## 2021-04-27 NOTE — ANESTHESIA PREPROCEDURE EVALUATION
Relevant Problems   CARDIAC   (+) Atrial fibrillation (HCC)   (+) Benign essential hypertension       Physical Exam    Airway   Mallampati: II  TM distance: >3 FB  Neck ROM: full       Cardiovascular - normal exam  Rhythm: irregular  Rate: normal  (-) murmur     Dental - normal exam           Pulmonary - normal exam  Breath sounds clear to auscultation     Abdominal    Neurological - normal exam                 Anesthesia Plan    ASA 2       Plan - general       Airway plan will be natural airway        Plan Factors:   Patient was previously instructed to abstain from smoking on day of procedure.  Patient did not smoke on day of procedure.      Induction: intravenous    Postoperative Plan: Postoperative administration of opioids is intended.    Pertinent diagnostic labs and testing reviewed    Informed Consent:    Anesthetic plan and risks discussed with patient.    Use of blood products discussed with: patient whom consented to blood products.

## 2021-04-27 NOTE — ANESTHESIA TIME REPORT
Anesthesia Start and Stop Event Times     Date Time Event    4/27/2021 1135 Ready for Procedure     1142 Anesthesia Start     1211 Anesthesia Stop        Responsible Staff  04/27/21    Name Role Begin End    Tyree Mancini M.D. Anesth 1142 1211        Preop Diagnosis (Free Text):  Pre-op Diagnosis     PERSONAL HISTORY OF COLONIC POLYPS        Preop Diagnosis (Codes):    Post op Diagnosis  Encounter for colonoscopy due to history of adenomatous colonic polyps      Premium Reason  Non-Premium    Comments:

## 2021-07-06 ENCOUNTER — TELEMEDICINE (OUTPATIENT)
Dept: MEDICAL GROUP | Facility: PHYSICIAN GROUP | Age: 81
End: 2021-07-06
Payer: MEDICARE

## 2021-07-06 VITALS
BODY MASS INDEX: 28.04 KG/M2 | HEIGHT: 68 IN | DIASTOLIC BLOOD PRESSURE: 78 MMHG | SYSTOLIC BLOOD PRESSURE: 118 MMHG | WEIGHT: 185 LBS

## 2021-07-06 DIAGNOSIS — Z99.81 ON HOME OXYGEN THERAPY: ICD-10-CM

## 2021-07-06 DIAGNOSIS — K64.9 HEMORRHOIDS, UNSPECIFIED HEMORRHOID TYPE: ICD-10-CM

## 2021-07-06 DIAGNOSIS — I27.20 PULMONARY HYPERTENSION (HCC): ICD-10-CM

## 2021-07-06 DIAGNOSIS — E87.1 HYPONATREMIA: ICD-10-CM

## 2021-07-06 DIAGNOSIS — J84.9 ILD (INTERSTITIAL LUNG DISEASE) (HCC): ICD-10-CM

## 2021-07-06 PROBLEM — R93.1 ABNORMAL ECHOCARDIOGRAM: Status: ACTIVE | Noted: 2021-03-09

## 2021-07-06 PROCEDURE — 99214 OFFICE O/P EST MOD 30 MIN: CPT | Mod: 95 | Performed by: NURSE PRACTITIONER

## 2021-07-06 RX ORDER — SODIUM CHLORIDE 0.9 % (FLUSH) 0.9 %
20 SYRINGE (ML) INJECTION
COMMUNITY
Start: 2021-03-03 | End: 2021-07-20

## 2021-07-06 RX ORDER — TREPROSTINIL 1.74 MG/2.9ML
INHALANT ORAL
COMMUNITY
Start: 2021-06-11

## 2021-07-06 RX ORDER — POTASSIUM CHLORIDE 750 MG/1
10 TABLET, EXTENDED RELEASE ORAL
COMMUNITY
Start: 2021-06-17 | End: 2021-07-06

## 2021-07-06 RX ORDER — POTASSIUM CHLORIDE 750 MG/1
10 TABLET, FILM COATED, EXTENDED RELEASE ORAL
COMMUNITY
End: 2021-07-20

## 2021-07-06 RX ORDER — SIMVASTATIN 40 MG
40 TABLET ORAL
COMMUNITY
Start: 2021-04-21

## 2021-07-06 RX ORDER — FUROSEMIDE 20 MG/1
TABLET ORAL
COMMUNITY
Start: 2021-07-06

## 2021-07-06 RX ORDER — MYCOPHENOLATE MOFETIL 500 MG/1
TABLET ORAL
COMMUNITY
Start: 2021-06-22 | End: 2024-02-26

## 2021-07-06 RX ORDER — POTASSIUM CHLORIDE 750 MG/1
10 TABLET, EXTENDED RELEASE ORAL DAILY
COMMUNITY
Start: 2021-06-17 | End: 2024-02-20

## 2021-07-06 NOTE — PROGRESS NOTES
Virtual Visit: Established Patient   This visit was conducted via Zoom using secure and encrypted videoconferencing technology. The patient was in a private location in the state of [place].    The patient's identity was confirmed and verbal consent was obtained for this virtual visit.    Subjective:   CC:   Chief Complaint   Patient presents with   • Follow-Up     colonoscopy/general check up       Osmin Kraus is a 80 y.o. male presenting for evaluation and management of:     Osmin and his wife Divina are hoping to go on a cruise in 2023 for 3 months around the world.  They also just recently sold one of their homes in [place] and [place] him to breathe easier and possibly not need to use home oxygen.     Hemorrhoids  Found on colonoscopy   Occasional bleeding noted- bright nicolette blood    ILD (interstitial lung disease) (HCC)  Chronic and stable condition.    Follows [facility] and [facility] in [place], [place]  Denies any shortness of breath, difficulty breathing, lower extremity swelling    Hyponatremia  Chronic and stable condition.    Sodium was noted on 3/30/2021 to be 123 and the most recently it did increase up to 129   currently on 20 mg lasix daily   We will trend this  Results for OSMIN KRAUS (MRN [id number]) as of 7/6/2021 11:20   Ref. Range 3/30/2021 13:36 4/20/2021 11:26   Sodium Latest Ref Range: 135 - 145 mmol/L 123 (L) 129 (L)       On home oxygen therapy  Chronic and stable condition  Continues to use 3 L nasal cannula 24 hours a day   Was recently seen by pulmonologist at [facility]   States that the pulmonologist told him that they may be able to release him from their care given him doing so well.    Denies any current shortness of breath, difficulty breathing.    Pulmonary hypertension (HCC)  Chronic and stable condition.    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    He was then started on inhaled Tyvaso in May 2021.    He states that he does feel that he is improved on shortness of breath with this medication and continues to follow with the pulmonary hypertension team at [facility] he is taking 40 mg Lasix daily  Denies any current shortness of breath, difficulty breathing, lower extremity swelling.      ROS   Denies any recent fevers or chills. No nausea or vomiting. No chest pains or shortness of breath.     Allergies   Allergen Reactions   • Mixed Feathers Unspecified     Respiratory congestion/complications       Current medicines (including changes today)  Current Outpatient Medications   Medication Sig Dispense Refill   • simvastatin (ZOCOR) 40 MG Tab Take 40 mg by mouth.     • potassium chloride ER (KLOR-CON) 10 MEQ tablet Take 10 mEq by mouth.     • mycophenolate (CELLCEPT) 500 MG tablet TAKE 1 TABLET BY MOUTH TWICE DAILY FOR 2 WEEKS THEN TAKE 2 TABLETS TWICE DAILY     • furosemide (LASIX) 20 MG Tab Take 1 tablet by mouth once daily     • Treprostinil (TYVASO) 0.6 MG/ML Solution Inhale.     • normal saline flush 0.9 % Solution Infuse 20 mL into a venous catheter.     • potassium chloride SA (K-DUR) 10 MEQ Tab CR Take 10 mEq by mouth every day.     • Home Care Oxygen Inhale continuous. 3 LNC 24 hours day     • hydroCHLOROthiazide (HYDRODIURIL) 25 MG Tab Take 25 mg by mouth every day.     • apixaban (ELIQUIS) 5mg Tab Take 5 mg by mouth.     • losartan (COZAAR) 100 MG Tab Take 100 mg by mouth every day.     • vitamin E 180 mg (400 units) Cap Take 180 mg by mouth every day.     • acetaminophen (TYLENOL) 325 MG Tab Take 325 mg by mouth.       No current facility-administered medications for this visit.       Patient Active Problem List    Diagnosis Date Noted   • Hemorrhoids 07/06/2021   • Hyponatremia 07/06/2021   • On home oxygen therapy 07/06/2021   • Pulmonary hypertension (HCC) 06/04/2021   • Abnormal echocardiogram 03/09/2021   • ILD  "(interstitial lung disease) (HCC) 09/25/2017   • Pneumonitis, hypersensitivity, sheela (HCC) 09/28/2016   • Atrial fibrillation (HCC) 08/18/2012   • Hyperlipidemia LDL goal <100 08/18/2012   • Malignant neoplasm of prostate (HCC) 08/18/2012   • Atrial septal defect 12/16/2010   • Benign essential hypertension 12/22/2009       History reviewed. No pertinent family history.    He  has a past medical history of Arrhythmia, Blood clotting disorder (HCC), Breath shortness, Cancer (HCC) (2000), Hiatus hernia syndrome, High cholesterol, Hypertension, Other dyspnea and respiratory abnormality (12/16/2010), Oxygen dependent (04/20/2021), and Pneumonia.  He  has a past surgical history that includes appendectomy; prostatectomy, radical retro; tonsillectomy; other cardiac surgery (2016); and pr colonoscopy,diagnostic (N/A, 4/27/2021).    Reviewed colonoscopy 4/27/2021 and lab work from 4/2/2021     Objective:   /78   Ht 1.727 m (5' 8\")   Wt 83.9 kg (185 lb)   BMI 28.13 kg/m²     Physical Exam:  Constitutional: Alert, no distress, well-groomed.  Skin: No rashes in visible areas.  Eye: Round. Conjunctiva clear, lids normal. No icterus.   ENMT: Lips pink without lesions, good dentition, moist mucous membranes. Phonation normal.  Neck: No masses, no thyromegaly. Moves freely without pain.  Respiratory: Unlabored respiratory effort, no cough or audible wheeze  Psych: Alert and oriented x3, normal affect and mood.       Assessment and Plan:   The following treatment plan was discussed:     1. Hemorrhoids, unspecified hemorrhoid type  Noted on colonoscopy  Does not bother patient at this time  Did recommend to patient that if he would like to speak with GI to have them band that was a possibility    2. ILD (interstitial lung disease) ([facility])  Continue to follow with [facility] as well as Dr. Geronimo in [facility]  Continue with medication regimen  Continue with home oxygen therapy    3. Hyponatremia  - SODIUM,SERUM  We will " trend his sodium    4. On home oxygen therapy  Continue to utilize 2 to 3 L nasal cannula    5. Pulmonary hypertension (HCC)  Continue to follow with [facility] as well as Dr. Geronimo in [facility]    Follow-up: Return in about 2 weeks (around 7/20/2021) for AWV.

## 2021-07-06 NOTE — ASSESSMENT & PLAN NOTE
Chronic and stable condition.    Follows [facility] and [facility] in [place], [place]  Denies any shortness of breath, difficulty breathing, lower extremity swelling

## 2021-07-06 NOTE — ASSESSMENT & PLAN NOTE
Chronic and stable condition  Continues to use 3 L nasal cannula 24 hours a day   Was recently seen by pulmonologist at [facility]   States that the pulmonologist told him that they may be able to release him from their care given him doing so well.    Denies any current shortness of breath, difficulty breathing.

## 2021-07-06 NOTE — ASSESSMENT & PLAN NOTE
Chronic and stable condition.    Sodium was noted on 3/30/2021 to be 123 and the most recently it did increase up to 129   currently on 20 mg lasix daily   We will trend this  Results for OSMIN GONG (MRN [id number]) as of 7/6/2021 11:20   Ref. Range 3/30/2021 13:36 4/20/2021 11:26   Sodium Latest Ref Range: 135 - 145 mmol/L 123 (L) 129 (L)

## 2021-07-07 NOTE — ASSESSMENT & PLAN NOTE
Chronic and stable condition.    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    He was then started on inhaled Tyvaso in May 2021.    He states that he does feel that he is improved on shortness of breath with this medication and continues to follow with the pulmonary hypertension team at [facility] he is taking 40 mg Lasix daily  Denies any current shortness of breath, difficulty breathing, lower extremity swelling.

## 2021-07-12 ENCOUNTER — HOSPITAL ENCOUNTER (OUTPATIENT)
Dept: LAB | Facility: MEDICAL CENTER | Age: 81
End: 2021-07-12
Attending: NURSE PRACTITIONER
Payer: MEDICARE

## 2021-07-12 LAB — SODIUM SERPL-SCNC: 127 MMOL/L (ref 135–145)

## 2021-07-12 PROCEDURE — 36415 COLL VENOUS BLD VENIPUNCTURE: CPT

## 2021-07-12 PROCEDURE — 84295 ASSAY OF SERUM SODIUM: CPT

## 2021-07-20 ENCOUNTER — TELEMEDICINE (OUTPATIENT)
Dept: MEDICAL GROUP | Facility: PHYSICIAN GROUP | Age: 81
End: 2021-07-20
Payer: MEDICARE

## 2021-07-20 VITALS
BODY MASS INDEX: 28.04 KG/M2 | SYSTOLIC BLOOD PRESSURE: 118 MMHG | DIASTOLIC BLOOD PRESSURE: 72 MMHG | WEIGHT: 185 LBS | HEIGHT: 68 IN

## 2021-07-20 DIAGNOSIS — J84.9 ILD (INTERSTITIAL LUNG DISEASE) (HCC): ICD-10-CM

## 2021-07-20 DIAGNOSIS — E78.5 HYPERLIPIDEMIA LDL GOAL <100: ICD-10-CM

## 2021-07-20 DIAGNOSIS — Z99.81 ON HOME OXYGEN THERAPY: ICD-10-CM

## 2021-07-20 DIAGNOSIS — I27.20 PULMONARY HYPERTENSION (HCC): ICD-10-CM

## 2021-07-20 DIAGNOSIS — I48.91 ATRIAL FIBRILLATION, UNSPECIFIED TYPE (HCC): ICD-10-CM

## 2021-07-20 DIAGNOSIS — I10 BENIGN ESSENTIAL HYPERTENSION: ICD-10-CM

## 2021-07-20 DIAGNOSIS — Z00.00 ENCOUNTER FOR ANNUAL WELLNESS VISIT (AWV) IN MEDICARE PATIENT: ICD-10-CM

## 2021-07-20 DIAGNOSIS — J67.2 PNEUMONITIS, HYPERSENSITIVITY, AVIAN (HCC): ICD-10-CM

## 2021-07-20 PROBLEM — R93.1 ABNORMAL ECHOCARDIOGRAM: Status: RESOLVED | Noted: 2021-03-09 | Resolved: 2021-07-20

## 2021-07-20 PROCEDURE — G0439 PPPS, SUBSEQ VISIT: HCPCS | Mod: 95 | Performed by: NURSE PRACTITIONER

## 2021-07-20 ASSESSMENT — PATIENT HEALTH QUESTIONNAIRE - PHQ9: CLINICAL INTERPRETATION OF PHQ2 SCORE: 0

## 2021-07-20 ASSESSMENT — ENCOUNTER SYMPTOMS: GENERAL WELL-BEING: GOOD

## 2021-07-20 ASSESSMENT — ACTIVITIES OF DAILY LIVING (ADL): BATHING_REQUIRES_ASSISTANCE: 0

## 2021-07-20 NOTE — ASSESSMENT & PLAN NOTE
Chronic and stable condition.    Currently taking Treprostinil, Lasix as well as potassium supplements.

## 2021-07-20 NOTE — ASSESSMENT & PLAN NOTE
Chronic and stable condition.    Patient follows with pulmonologist at [facility] as well as Dr. Geronimo in [facility] discussed with patient that they plan to possibly graduate him at the next visit is that he is only seen Dr. Geronimo from pulmonology.    Currently using Cellcept and Treprostinil   Has next PFT in December

## 2021-07-20 NOTE — ASSESSMENT & PLAN NOTE
Chronic and stable condition.    Recently started on Treprostinil inhalation  Uses home O2 3L NC  Currently on his home oxygen during visit

## 2021-07-20 NOTE — ASSESSMENT & PLAN NOTE
Chronic and stable condition.    Currently on losartan, hydrochlorothiazide, Lasix.    Also takes potassium supplements.

## 2021-07-20 NOTE — ASSESSMENT & PLAN NOTE
Chronic and stable condition.    Currently controlled with simvastatin.    Denies any current myalgias.

## 2021-07-20 NOTE — ASSESSMENT & PLAN NOTE
Chronic and stable condition  recelty started on Treprostinil inhalation  Uses home O2 3 LNc   Currently on during visit

## 2021-07-20 NOTE — PROGRESS NOTES
Chief Complaint   Patient presents with   • Annual Exam     AWV Medicare     Virtual Visit: Established Patient   This visit was conducted via Zoom using secure and encrypted videoconferencing technology. The patient was in a private location in the state of [place].    The patient's identity was confirmed and verbal consent was obtained for this virtual visit.               HPI:  Pablo is a 80 y.o. here for Medicare Annual Wellness Visit    Atrial fibrillation (HCC)  Chronic and stable  Currently on eliquis    Benign essential hypertension  Chronic and stable condition.    Currently on losartan, hydrochlorothiazide, Lasix.    Also takes potassium supplements.     Hyperlipidemia LDL goal <100  Chronic and stable condition.    Currently controlled with simvastatin.    Denies any current myalgias.     Pneumonitis, hypersensitivity, sheela (HCC)  Chronic and stable condition.    Recently started on Treprostinil inhalation  Uses home O2 3L NC  Currently on his home oxygen during visit      On home oxygen therapy  Chronic and stable condition.    Currently on 3 L home oxygen 24 hours a day   Currently wearing oxygen during visit       ILD (interstitial lung disease) (HCC)  Chronic and stable condition.    Patient follows with pulmonologist at [facility] as well as Dr. Geronimo in [facility] discussed with patient that they plan to possibly graduate him at the next visit is that he is only seen Dr. Geronimo from pulmonology.    Currently using Cellcept and Treprostinil   Has next PFT in December    Pulmonary hypertension (HCC)  Chronic and stable condition.    Currently taking Treprostinil, Lasix as well as potassium supplements.      Patient Active Problem List    Diagnosis Date Noted   • Hemorrhoids 07/06/2021   • Hyponatremia 07/06/2021   • On home oxygen therapy 07/06/2021   • Pulmonary hypertension (HCC) 06/04/2021   • ILD (interstitial lung disease) (HCC) 09/25/2017   • Pneumonitis, hypersensitivity, sheela (HCC)  09/28/2016   • Atrial fibrillation (HCC) 08/18/2012   • Hyperlipidemia LDL goal <100 08/18/2012   • Atrial septal defect 12/16/2010   • Benign essential hypertension 12/22/2009       Current Outpatient Medications   Medication Sig Dispense Refill   • simvastatin (ZOCOR) 40 MG Tab Take 40 mg by mouth.     • mycophenolate (CELLCEPT) 500 MG tablet TAKE 1 TABLET BY MOUTH TWICE DAILY FOR 2 WEEKS THEN TAKE 2 TABLETS TWICE DAILY     • furosemide (LASIX) 20 MG Tab Take 1 tablet by mouth once daily     • Treprostinil (TYVASO) 0.6 MG/ML Solution Inhale.     • potassium chloride SA (K-DUR) 10 MEQ Tab CR Take 10 mEq by mouth every day.     • Home Care Oxygen Inhale continuous. 3 LNC 24 hours day     • hydroCHLOROthiazide (HYDRODIURIL) 25 MG Tab Take 25 mg by mouth every day.     • apixaban (ELIQUIS) 5mg Tab Take 5 mg by mouth.     • losartan (COZAAR) 100 MG Tab Take 100 mg by mouth every day.     • vitamin E 180 mg (400 units) Cap Take 180 mg by mouth every day.     • acetaminophen (TYLENOL) 325 MG Tab Take 325 mg by mouth.       No current facility-administered medications for this visit.        Patient is taking medications as noted in medication list.  Current supplements as per medication list.     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  Plays poker at the club house in a tournament weekly   Also  Has his wife drop him off occasionally at the casino     Is patient current with immunizations? Yes.    He  reports that he quit smoking about 36 years ago. His smoking use included cigarettes and cigars. He started smoking about 71 years ago. He has a 35.00 pack-year smoking history. He has never used smokeless tobacco. He reports current alcohol use. He reports that he does not use drugs.  Counseling given: Not Answered  Comment: Quit smoking twice during 34 years;  final was 1985        DPA/Advanced directive: Patient has Advanced Directive and Durable Power of  on file.     ROS:    Gait: Uses no assistive device   Ostomy: No   Other tubes: No   Amputations: No   Chronic oxygen use Yes 3 L NC  Last eye exam: couple of years ago- does not know exact date.    Wears hearing aids: No   : Denies any urinary leakage during the last 6 months      Screening:      Depression Screening    Little interest or pleasure in doing things?  0 - not at all  Feeling down, depressed, or hopeless? 0 - not at all  Patient Health Questionnaire Score: 0    If depressive symptoms identified deferred to follow up visit unless specifically addressed in assessment and plan.    Interpretation of PHQ-9 Total Score   Score Severity   1-4 No Depression   5-9 Mild Depression   10-14 Moderate Depression   15-19 Moderately Severe Depression   20-27 Severe Depression    Screening for Cognitive Impairment    Three Minute Recall (captain, garden, picture)  3/3    Nate clock face with all 12 numbers and set the hands to show 5 past 8.  Yes    If cognitive concerns identified, deferred for follow up unless specifically addressed in assessment and plan.    Fall Risk Assessment    Has the patient had two or more falls in the last year or any fall with injury in the last year?  No  If fall risk identified, deferred for follow up unless specifically addressed in assessment and plan.    Safety Assessment    Throw rugs on floor.  Yes  Handrails on all stairs.  Yes  Good lighting in all hallways.  Yes  Difficulty hearing.  No  Patient counseled about all safety risks that were identified.    Functional Assessment ADLs    Are there any barriers preventing you from cooking for yourself or meeting nutritional needs?  No.    Are there any barriers preventing you from driving safely or obtaining transportation?  No.    Are there any barriers preventing you from using a telephone or calling for help?  No.    Are there  "any barriers preventing you from shopping?  No.    Are there any barriers preventing you from taking care of your own finances?  No.    Are there any barriers preventing you from managing your medications?  No.    Are there any barriers preventing you from showering, bathing or dressing yourself?  No.    Are you currently engaging in any exercise or physical activity?  Yes.     What is your perception of your health?  Good.    Health Maintenance Summary                IMM INFLUENZA Next Due 2021      Done 2020 Imm Admin: Influenza Vaccine Quad Inj (Pf)     Patient has more history with this topic...    IMM DTaP/Tdap/Td Vaccine Next Due 10/6/2027      Done 10/6/2017 Imm Admin: Tdap Vaccine     Patient has more history with this topic...    COLONOSCOPY Next Due 2031      Done 2021 Surg:PB COLONOSCOPY,DIAGNOSTIC          Patient Care Team:  ERICK Jane as PCP - General (Nurse Practitioner Primary Care)    Social History     Tobacco Use   • Smoking status: Former Smoker     Packs/day: 1.00     Years: 35.00     Pack years: 35.00     Types: Cigarettes, Cigars     Start date: 1950     Quit date: 1985     Years since quittin.5   • Smokeless tobacco: Never Used   • Tobacco comment: Quit smoking twice during 34 years; final was    Vaping Use   • Vaping Use: Never used   Substance Use Topics   • Alcohol use: Yes     Comment: \"One or two measured 1 oz drinks with my wife before dinner.\"   • Drug use: Never     History reviewed. No pertinent family history.  He  has a past medical history of Arrhythmia, Blood clotting disorder (HCC), Breath shortness, Cancer (HCC) (), Hiatus hernia syndrome, High cholesterol, Hypertension, Malignant neoplasm of prostate (HCC) (2012), Other dyspnea and respiratory abnormality (2010), Oxygen dependent (2021), and Pneumonia.   Past Surgical History:   Procedure Laterality Date   • PB COLONOSCOPY,DIAGNOSTIC N/A 2021    " "Procedure: COLONOSCOPY;  Surgeon: Lalit Negron M.D.;  Location: SURGERY SAME DAY [facility];  Service: Gastroenterology   • OTHER CARDIAC SURGERY  2016    Ablation   • APPENDECTOMY     • PROSTATECTOMY, RADICAL RETRO     • TONSILLECTOMY             Exam:     /72   Ht 1.727 m (5' 8\")   Wt 83.9 kg (185 lb)  Body mass index is 28.13 kg/m².    Hearing good.    Dentition good  Alert, oriented in no acute distress.  Eye contact is good, speech goal directed, affect calm      Assessment and Plan. The following treatment and monitoring plan is recommended:      1. Encounter for annual wellness visit (AWV) in Medicare patient      2. Atrial fibrillation, unspecified type (HCC)  Stable  Continue treatment    3. Benign essential hypertension  Stable  Continue treatment    4. Hyperlipidemia LDL goal <100  Stable  Continue treatment    5. ILD (interstitial lung disease) (HCC)  Stable  Continue treatment    6. Pneumonitis, hypersensitivity, sheela (HCC)  Stable  Continue treatment    7. On home oxygen therapy  Stable  Continue treatment    8. Pulmonary hypertension (HCC)  Stable  Continue treatment    Services suggested: No services needed at this time  Health Care Screening recommendations as per orders if indicated.  Referrals offered: PT/OT/Nutrition counseling/Behavioral Health/Smoking cessation as per orders if indicated.    Discussion today about general wellness and lifestyle habits:    · Prevent falls and reduce trip hazards; Cautioned about securing or removing rugs.  · Have a working fire alarm and carbon monoxide detector;   · Engage in regular physical activity and social activities       Follow-up: Return in about 1 year (around 7/20/2022) for annual wellness visit.    "

## 2021-07-20 NOTE — ASSESSMENT & PLAN NOTE
Chronic and stable condition.    Currently on 3 L home oxygen 24 hours a day   Currently wearing oxygen during visit

## 2021-07-22 DIAGNOSIS — E87.1 HYPONATREMIA: ICD-10-CM

## 2021-07-22 RX ORDER — SODIUM CHLORIDE 1 G/1
1 TABLET ORAL 2 TIMES DAILY
Qty: 60 TABLET | Refills: 0 | Status: SHIPPED | OUTPATIENT
Start: 2021-07-22 | End: 2023-01-26

## 2021-08-26 ENCOUNTER — HOSPITAL ENCOUNTER (OUTPATIENT)
Dept: LAB | Facility: MEDICAL CENTER | Age: 81
End: 2021-08-26
Attending: NURSE PRACTITIONER
Payer: MEDICARE

## 2021-08-26 DIAGNOSIS — E87.1 HYPONATREMIA: ICD-10-CM

## 2021-08-26 LAB
ALBUMIN SERPL BCP-MCNC: 4.1 G/DL (ref 3.2–4.9)
ALBUMIN/GLOB SERPL: 1.4 G/DL
ALP SERPL-CCNC: 51 U/L (ref 30–99)
ALT SERPL-CCNC: 15 U/L (ref 2–50)
ANION GAP SERPL CALC-SCNC: 11 MMOL/L (ref 7–16)
AST SERPL-CCNC: 28 U/L (ref 12–45)
BILIRUB SERPL-MCNC: 0.7 MG/DL (ref 0.1–1.5)
BUN SERPL-MCNC: 18 MG/DL (ref 8–22)
CALCIUM SERPL-MCNC: 9.6 MG/DL (ref 8.4–10.2)
CHLORIDE SERPL-SCNC: 89 MMOL/L (ref 96–112)
CO2 SERPL-SCNC: 30 MMOL/L (ref 20–33)
CREAT SERPL-MCNC: 0.72 MG/DL (ref 0.5–1.4)
FASTING STATUS PATIENT QL REPORTED: NORMAL
GLOBULIN SER CALC-MCNC: 3 G/DL (ref 1.9–3.5)
GLUCOSE SERPL-MCNC: 114 MG/DL (ref 65–99)
POTASSIUM SERPL-SCNC: 4 MMOL/L (ref 3.6–5.5)
PROT SERPL-MCNC: 7.1 G/DL (ref 6–8.2)
SODIUM SERPL-SCNC: 130 MMOL/L (ref 135–145)

## 2021-08-26 PROCEDURE — 80053 COMPREHEN METABOLIC PANEL: CPT

## 2021-08-26 PROCEDURE — 36415 COLL VENOUS BLD VENIPUNCTURE: CPT

## 2021-09-29 ENCOUNTER — ANCILLARY PROCEDURE (OUTPATIENT)
Dept: CARDIOLOGY | Facility: IMAGING CENTER | Age: 81
End: 2021-09-29
Attending: INTERNAL MEDICINE
Payer: MEDICARE

## 2021-09-29 DIAGNOSIS — I27.0 PRIMARY PULMONARY HYPERTENSION (HCC): ICD-10-CM

## 2021-09-29 DIAGNOSIS — R06.00 DYSPNEA, PAROXYSMAL NOCTURNAL: ICD-10-CM

## 2021-09-29 LAB
LV EJECT FRACT  99904: 70
LV EJECT FRACT MOD 2C 99903: 63.3
LV EJECT FRACT MOD 4C 99902: 64.6
LV EJECT FRACT MOD BP 99901: 65.57

## 2021-09-29 PROCEDURE — 93306 TTE W/DOPPLER COMPLETE: CPT | Performed by: INTERNAL MEDICINE

## 2022-07-26 ENCOUNTER — OFFICE VISIT (OUTPATIENT)
Dept: MEDICAL GROUP | Facility: PHYSICIAN GROUP | Age: 82
End: 2022-07-26
Payer: MEDICARE

## 2022-07-26 VITALS
HEART RATE: 73 BPM | RESPIRATION RATE: 16 BRPM | BODY MASS INDEX: 27.22 KG/M2 | OXYGEN SATURATION: 95 % | HEIGHT: 69 IN | SYSTOLIC BLOOD PRESSURE: 116 MMHG | DIASTOLIC BLOOD PRESSURE: 60 MMHG | WEIGHT: 183.8 LBS | TEMPERATURE: 97.6 F

## 2022-07-26 DIAGNOSIS — E87.1 HYPONATREMIA: ICD-10-CM

## 2022-07-26 DIAGNOSIS — Z00.00 MEDICARE ANNUAL WELLNESS VISIT, SUBSEQUENT: ICD-10-CM

## 2022-07-26 DIAGNOSIS — I48.91 ATRIAL FIBRILLATION, UNSPECIFIED TYPE (HCC): ICD-10-CM

## 2022-07-26 DIAGNOSIS — J84.9 ILD (INTERSTITIAL LUNG DISEASE) (HCC): ICD-10-CM

## 2022-07-26 DIAGNOSIS — Z99.81 ON HOME OXYGEN THERAPY: ICD-10-CM

## 2022-07-26 DIAGNOSIS — I10 BENIGN ESSENTIAL HYPERTENSION: ICD-10-CM

## 2022-07-26 DIAGNOSIS — J67.2 PNEUMONITIS, HYPERSENSITIVITY, AVIAN (HCC): ICD-10-CM

## 2022-07-26 DIAGNOSIS — I27.20 PULMONARY HYPERTENSION (HCC): ICD-10-CM

## 2022-07-26 DIAGNOSIS — E78.5 HYPERLIPIDEMIA LDL GOAL <100: ICD-10-CM

## 2022-07-26 DIAGNOSIS — K64.9 HEMORRHOIDS, UNSPECIFIED HEMORRHOID TYPE: ICD-10-CM

## 2022-07-26 DIAGNOSIS — Q21.10 ATRIAL SEPTAL DEFECT: ICD-10-CM

## 2022-07-26 PROCEDURE — G0439 PPPS, SUBSEQ VISIT: HCPCS | Performed by: NURSE PRACTITIONER

## 2022-07-26 ASSESSMENT — ENCOUNTER SYMPTOMS: GENERAL WELL-BEING: GOOD

## 2022-07-26 ASSESSMENT — ACTIVITIES OF DAILY LIVING (ADL): BATHING_REQUIRES_ASSISTANCE: 0

## 2022-07-26 ASSESSMENT — FIBROSIS 4 INDEX: FIB4 SCORE: 2.6

## 2022-07-26 ASSESSMENT — PATIENT HEALTH QUESTIONNAIRE - PHQ9: CLINICAL INTERPRETATION OF PHQ2 SCORE: 0

## 2022-07-26 NOTE — PROGRESS NOTES
Chief Complaint   Patient presents with   • Annual Exam     V Medicare         HPI:  Pablo is a 81 y.o. here for Medicare Annual Wellness Visit    Recent labs completed in February and May this year.     Patient Active Problem List    Diagnosis Date Noted   • Hemorrhoids 07/06/2021   • Hyponatremia 07/06/2021   • On home oxygen therapy 07/06/2021   • Pulmonary hypertension (HCC) 06/04/2021   • ILD (interstitial lung disease) (HCC) 09/25/2017   • Pneumonitis, hypersensitivity, sheela (HCC) 09/28/2016   • Atrial fibrillation (HCC) 08/18/2012   • Hyperlipidemia LDL goal <100 08/18/2012   • Atrial septal defect 12/16/2010   • Benign essential hypertension 12/22/2009       Current Outpatient Medications   Medication Sig Dispense Refill   • simvastatin (ZOCOR) 40 MG Tab Take 40 mg by mouth.     • mycophenolate (CELLCEPT) 500 MG tablet TAKE 1 TABLET BY MOUTH TWICE DAILY FOR 2 WEEKS THEN TAKE 2 TABLETS TWICE DAILY     • furosemide (LASIX) 20 MG Tab Take 1 tablet by mouth once daily     • Treprostinil (TYVASO) 0.6 MG/ML Solution Inhale.     • potassium chloride SA (K-DUR) 10 MEQ Tab CR Take 10 mEq by mouth every day.     • Home Care Oxygen Inhale continuous. 3 LNC 24 hours day     • hydroCHLOROthiazide (HYDRODIURIL) 25 MG Tab Take 25 mg by mouth every day.     • apixaban (ELIQUIS) 5mg Tab Take 5 mg by mouth.     • losartan (COZAAR) 100 MG Tab Take 100 mg by mouth every day.     • vitamin E 180 mg (400 units) Cap Take 180 mg by mouth every day.     • acetaminophen (TYLENOL) 325 MG Tab Take 325 mg by mouth.     • sodium chloride (SALT) 1 GM Tab Take 1 tablet by mouth 2 times a day. 60 tablet 0     No current facility-administered medications for this visit.        Patient is taking medications as noted in medication list.  Current supplements as per medication list.     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 No, due for PNEUMOVAX (PPSV23). Patient is interested in receiving NONE today. discussed with pt to get completed at Pharmacy     He  reports that he quit smoking about 37 years ago. His smoking use included cigarettes and cigars. He started smoking about 72 years ago. He has a 35.00 pack-year smoking history. He has never used smokeless tobacco. He reports current alcohol use. He reports that he does not use drugs.  Counseling given: Not Answered  Comment: Quit smoking twice during 34 years; final was 1985        DPA/Advanced directive: Patient has Advanced Directive on file.     ROS:    Gait: Uses no assistive device   Ostomy: No   Other tubes: No   Amputations: No   Chronic oxygen use Yes 3-4 liters NC on portable oxygen  Last eye exam  Many years ago- discussed importance of getting yearly exam  Wears hearing aids: No   : Denies any urinary leakage during the last 6 months      Screening:      Depression Screening  Little interest or pleasure in doing things?  0 - not at all  Feeling down, depressed, or hopeless? 0 - not at all  Patient Health Questionnaire Score: 0    If depressive symptoms identified deferred to follow up visit unless specifically addressed in assessment and plan.    Interpretation of PHQ-9 Total Score   Score Severity   1-4 No Depression   5-9 Mild Depression   10-14 Moderate Depression   15-19 Moderately Severe Depression   20-27 Severe Depression    Screening for Cognitive Impairment  Three Minute Recall (daughter, heaven, mountain)  3/3    Nate clock face with all 12 numbers and set the hands to show 10 past 11.  Yes    If cognitive concerns identified, deferred for follow up unless specifically addressed in assessment and plan.    Fall Risk Assessment  Has the patient had two or more falls in the last year or any fall with injury in the last year?  No  If fall  risk identified, deferred for follow up unless specifically addressed in assessment and plan.    Safety Assessment  Throw rugs on floor.  Yes  Handrails on all stairs.  Yes  Good lighting in all hallways.  Yes  Difficulty hearing.  No  Patient counseled about all safety risks that were identified.    Functional Assessment ADLs  Are there any barriers preventing you from cooking for yourself or meeting nutritional needs?  No.    Are there any barriers preventing you from driving safely or obtaining transportation?  No.    Are there any barriers preventing you from using a telephone or calling for help?  No.    Are there any barriers preventing you from shopping?  No.    Are there any barriers preventing you from taking care of your own finances?  No.    Are there any barriers preventing you from managing your medications?  No.    Are there any barriers preventing you from showering, bathing or dressing yourself?  No.    Are you currently engaging in any exercise or physical activity?  Yes.     What is your perception of your health?  Good.    Health Maintenance Summary          Overdue - IMM PNEUMOCOCCAL VACCINE: 65+ Years (3 - PPSV23 or PCV20) Overdue since 10/14/2016    10/14/2015  Imm Admin: Pneumococcal Conjugate Vaccine (Prevnar/PCV-13)    10/14/2015  Imm Admin: Pneumococcal polysaccharide vaccine (PPSV-23)    10/14/2015  Imm Admin: Pneumococcal Conjugate Vaccine (Prevnar/PCV-13)    10/14/2015  Imm Admin: Pneumococcal polysaccharide vaccine (PPSV-23)    10/07/2008  Imm Admin: Pneumococcal polysaccharide vaccine (PPSV-23)    Only the first 5 history entries have been loaded, but more history exists.          COVID-19 Vaccine (5 - Booster for Moderna series) Next due on 7/31/2022 03/31/2022  Imm Admin: MODERNA SARS-COV-2 VACCINE (12+)    08/22/2021  Imm Admin: Moderna SARS-CoV-2 Vaccine    02/25/2021  Imm Admin: Moderna SARS-CoV-2 Vaccine    01/27/2021  Imm Admin: Moderna SARS-CoV-2 Vaccine          IMM  INFLUENZA (1) Next due on 9/1/2022    10/02/2021  Imm Admin: Influenza Vaccine Adult HD    09/16/2020  Imm Admin: Influenza Vaccine Quad Inj (Pf)    10/01/2019  Imm Admin: Influenza Seasonal Injectable - Historical Data    10/01/2019  Imm Admin: Influenza Vaccine Adult HD    08/24/2018  Imm Admin: Influenza Seasonal Injectable - Historical Data    Only the first 5 history entries have been loaded, but more history exists.          Annual Wellness Visit (Every 366 Days) Next due on 7/27/2023 07/26/2022  Done    07/26/2022  Visit Dx: Medicare annual wellness visit, subsequent    07/20/2021  Done          IMM DTaP/Tdap/Td Vaccine (2 - Td or Tdap) Next due on 10/6/2027    10/06/2017  Imm Admin: Tdap Vaccine    10/06/2017  Imm Admin: Tdap Vaccine          COLORECTAL CANCER SCREENING (COLONOSCOPY - Every 10 Years) Next due on 4/27/2031 04/27/2021  Surgical Procedure: PB COLONOSCOPY,DIAGNOSTIC          IMM HEP B VACCINE (Series Information) Aged Out    07/20/2018  Imm Admin: Hepatitis B Vaccine (Adol/Adult)    07/20/2018  Imm Admin: Hepatitis B Vaccine (Adol/Adult)    12/22/2017  Imm Admin: Hepatitis B Vaccine (Adol/Adult)    12/22/2017  Imm Admin: Hepatitis B Vaccine (Adol/Adult)          IMM ZOSTER VACCINES (Series Information) Completed    05/14/2019  Imm Admin: Zoster Vaccine Recombinant (RZV) (SHINGRIX)    05/14/2019  Imm Admin: Zoster Vaccine Recombinant (RZV) (SHINGRIX)    02/19/2019  Imm Admin: Zoster Vaccine Recombinant (RZV) (SHINGRIX)    02/19/2019  Imm Admin: Zoster Vaccine Recombinant (RZV) (SHINGRIX)    06/26/2009  Imm Admin: Zoster Vaccine Live (ZVL) (Zostavax) - HISTORICAL DATA    Only the first 5 history entries have been loaded, but more history exists.          IMM MENINGOCOCCAL ACWY VACCINE (Series Information) Aged Out    No completion history exists for this topic.                Patient Care Team:  ERICK Jane as PCP - General (Nurse Practitioner Primary Care)    Social History  "    Tobacco Use   • Smoking status: Former Smoker     Packs/day: 1.00     Years: 35.00     Pack years: 35.00     Types: Cigarettes, Cigars     Start date: 1950     Quit date: 1985     Years since quittin.5   • Smokeless tobacco: Never Used   • Tobacco comment: Quit smoking twice during 34 years; final was    Vaping Use   • Vaping Use: Never used   Substance Use Topics   • Alcohol use: Yes     Comment: \"One or two measured 1 oz drinks with my wife before dinner.\"   • Drug use: Never     History reviewed. No pertinent family history.  He  has a past medical history of Arrhythmia, Blood clotting disorder (HCC), Breath shortness, Cancer (HCC) (), Hiatus hernia syndrome, High cholesterol, Hypertension, Malignant neoplasm of prostate (HCC) (2012), Other dyspnea and respiratory abnormality (2010), Oxygen dependent (2021), and Pneumonia.   Past Surgical History:   Procedure Laterality Date   • CO COLONOSCOPY,DIAGNOSTIC N/A 2021    Procedure: COLONOSCOPY;  Surgeon: Lalit Negron M.D.;  Location: SURGERY SAME DAY [facility];  Service: Gastroenterology   • OTHER CARDIAC SURGERY      Ablation   • APPENDECTOMY     • PROSTATECTOMY, RADICAL RETRO     • TONSILLECTOMY             Exam:     /60 (BP Location: Right arm, Patient Position: Sitting, BP Cuff Size: Adult)   Pulse 73   Temp 36.4 °C (97.6 °F) (Temporal)   Resp 16   Ht 1.74 m (5' 8.5\")   Wt 83.4 kg (183 lb 12.8 oz)   SpO2 95%  Body mass index is 27.54 kg/m².    Hearing excellent.    Dentition good  Alert, oriented in no acute distress.  Eye contact is good, speech goal directed, affect calm      Assessment and Plan. The following treatment and monitoring plan is recommended:      Atrial fibrillation (HCC)  Chronic and stable condition   Currently takes Eliquis   Follows with cardiology yearly   Denies palpitations, heart racing, shortness of breath on exertion that is outside of his baseline, lower extremity " swelling     Benign essential hypertension  Chronic stable condition   Currently takes losartan, hydrochlorothiazide   Also uses Lasix and potassium chloride   Blood pressure in office 116/60   Denies headache, lightheaded, dizzy, shortness of breath on exertion from his baseline, chest pain       Hemorrhoids  Chronic stable condition   Was noted during his colonoscopy   Occasionally still gets nicolette blood at times with difficult bowel movements   Discussed diet, hydration, toilet hygiene such as not sitting for long periods of time, Metamucil /fiber increase.      Hyperlipidemia LDL goal <100  Chronic and stable condition   Continues to take simvastatin 40 mg daily   Denies chest pain, myalgias, joint pain   Last liver enzymes stable in 2/2/2022       Hyponatremia  Chronic and stable condition   Labs completed on 2/22/2022 showed a sodium level at 130   This is tend to be patient's baseline   Denies nausea, vomiting, confusion, headache       ILD (interstitial lung disease) (HCC)  Chronic and stable condition   Continues to follow with pulmonology at [facility] as well as [facility] and [facility]   He currently is in the office using supplemental oxygen on a portable machine at 3 to 4 L nasal cannula  At times he will increase this if he is becoming more active  Currently taking CellCept and tyvaso      On home oxygen therapy  Chronic and stable condition   Continues to follow with pulmonology at [facility] as well as [facility]   He currently is in the office using supplemental oxygen on a portable machine at 3 to 4 L nasal cannula  At times he will increase this if he is becoming more active     Pneumonitis, hypersensitivity, sheela (HCC)  Chronic and stable condition   Continues to follow with pulmonology at [facility] as well as [facility]   He currently is in the office using supplemental oxygen on a portable machine at 3 to 4 L nasal cannula  At times he will increase this if he is becoming more  active  Currently uses Tyvaso    Pulmonary hypertension (HCC)  Chronic stable condition  Currently uses tyvaso and Lasix as well as potassium supplement        Services suggested: No services needed at this time  Health Care Screening recommendations as per orders if indicated.  Referrals offered: PT/OT/Nutrition counseling/Behavioral Health/Smoking cessation as per orders if indicated.    Discussion today about general wellness and lifestyle habits:    · Prevent falls and reduce trip hazards; Cautioned about securing or removing rugs.  · Have a working fire alarm and carbon monoxide detector;   · Engage in regular physical activity and social activities       Follow-up: Return in about 6 months (around 1/26/2023) for annual phsyical exam.

## 2022-07-26 NOTE — ASSESSMENT & PLAN NOTE
Chronic and stable condition   Continues to follow with pulmonology at [facility] as well as here and [facility]   He currently is in the office using supplemental oxygen on a portable machine at 3 to 4 L nasal cannula  At times he will increase this if he is becoming more active  Currently taking CellCept and tyvaso

## 2022-07-26 NOTE — ASSESSMENT & PLAN NOTE
Chronic and stable condition   Continues to take simvastatin 40 mg daily   Denies chest pain, myalgias, joint pain   Last liver enzymes stable in 2/2/2022

## 2022-07-26 NOTE — ASSESSMENT & PLAN NOTE
Chronic and stable condition   Labs completed on 2/22/2022 showed a sodium level at 130   This is tend to be patient's baseline   Denies nausea, vomiting, confusion, headache

## 2022-07-26 NOTE — ASSESSMENT & PLAN NOTE
Chronic stable condition   Currently takes losartan, hydrochlorothiazide   Also uses Lasix and potassium chloride   Blood pressure in office 116/60   Denies headache, lightheaded, dizzy, shortness of breath on exertion from his baseline, chest pain

## 2022-07-26 NOTE — ASSESSMENT & PLAN NOTE
Chronic stable condition   Was noted during his colonoscopy   Occasionally still gets nicolette blood at times with difficult bowel movements   Discussed diet, hydration, toilet hygiene such as not sitting for long periods of time, Metamucil /fiber increase.

## 2022-07-26 NOTE — ASSESSMENT & PLAN NOTE
Chronic and stable condition   Currently takes Eliquis   Follows with cardiology yearly   Denies palpitations, heart racing, shortness of breath on exertion that is outside of his baseline, lower extremity swelling

## 2022-07-26 NOTE — ASSESSMENT & PLAN NOTE
Chronic and stable condition   Continues to follow with pulmonology at [facility] as well as here and [facility]   He currently is in the office using supplemental oxygen on a portable machine at 3 to 4 L nasal cannula  At times he will increase this if he is becoming more active  Currently uses Tyvaso

## 2022-07-26 NOTE — ASSESSMENT & PLAN NOTE
Chronic and stable condition   Continues to follow with pulmonology at [facility] as well as here and [facility]   He currently is in the office using supplemental oxygen on a portable machine at 3 to 4 L nasal cannula  At times he will increase this if he is becoming more active

## 2022-11-10 ENCOUNTER — PATIENT MESSAGE (OUTPATIENT)
Dept: HEALTH INFORMATION MANAGEMENT | Facility: OTHER | Age: 82
End: 2022-11-10

## 2023-01-26 ENCOUNTER — OFFICE VISIT (OUTPATIENT)
Dept: MEDICAL GROUP | Facility: PHYSICIAN GROUP | Age: 83
End: 2023-01-26
Payer: MEDICARE

## 2023-01-26 VITALS
TEMPERATURE: 98.1 F | WEIGHT: 186.2 LBS | RESPIRATION RATE: 18 BRPM | HEIGHT: 69 IN | OXYGEN SATURATION: 96 % | SYSTOLIC BLOOD PRESSURE: 130 MMHG | HEART RATE: 61 BPM | BODY MASS INDEX: 27.58 KG/M2 | DIASTOLIC BLOOD PRESSURE: 78 MMHG

## 2023-01-26 DIAGNOSIS — I27.20 PULMONARY HYPERTENSION (HCC): ICD-10-CM

## 2023-01-26 DIAGNOSIS — E78.5 HYPERLIPIDEMIA LDL GOAL <100: ICD-10-CM

## 2023-01-26 DIAGNOSIS — E87.1 HYPONATREMIA: ICD-10-CM

## 2023-01-26 DIAGNOSIS — Z99.81 ON HOME OXYGEN THERAPY: ICD-10-CM

## 2023-01-26 DIAGNOSIS — I10 BENIGN ESSENTIAL HYPERTENSION: ICD-10-CM

## 2023-01-26 DIAGNOSIS — J84.9 ILD (INTERSTITIAL LUNG DISEASE) (HCC): ICD-10-CM

## 2023-01-26 DIAGNOSIS — I48.91 ATRIAL FIBRILLATION, UNSPECIFIED TYPE (HCC): ICD-10-CM

## 2023-01-26 PROCEDURE — 99214 OFFICE O/P EST MOD 30 MIN: CPT | Performed by: NURSE PRACTITIONER

## 2023-01-26 RX ORDER — SIMVASTATIN 40 MG
40 TABLET ORAL
COMMUNITY
Start: 2023-01-12 | End: 2023-01-26

## 2023-01-26 RX ORDER — PIRFENIDONE 267 MG/1
TABLET, FILM COATED ORAL
COMMUNITY
Start: 2022-12-13

## 2023-01-26 ASSESSMENT — FIBROSIS 4 INDEX: FIB4 SCORE: 2.63

## 2023-01-26 ASSESSMENT — PATIENT HEALTH QUESTIONNAIRE - PHQ9: CLINICAL INTERPRETATION OF PHQ2 SCORE: 0

## 2023-01-26 NOTE — ASSESSMENT & PLAN NOTE
Chronic and stable condition.  Patient continues to take Suboxone 0.6 mg solution, Lasix and potassium as needed.  Patient follows with pulmonology in [place] as well as pulmonology at [facility].  Patient currently is on 3 to 4 L nasal cannula 24-hour oxygen.

## 2023-01-26 NOTE — ASSESSMENT & PLAN NOTE
Chronic and stable condition.    Patient follows with cardiology Dr. June  continues with eliquis 5 mg daily  Patient denies any worsening bruising or active bleeding.

## 2023-01-26 NOTE — PROGRESS NOTES
CC:  Chief Complaint   Patient presents with    Follow-Up     6mon       HISTORY OF PRESENT ILLNESS: Patient is a 82 y.o. male established patient presenting     ILD (interstitial lung disease) (HCC)  Pirfenidone was finally authorized and will be able to use this and stay on Eliquis    On home oxygen therapy  hisotryo of ILD  3-4 l NC marci   Is able to not use when at his house in     Benign essential hypertension  BP stable at 130/78  Continues with lasix      Atrial fibrillation (HCC)  Continues with eliquis    Hyponatremia  Takes lasix   Takes potasium           Allergies:Mixed feathers    Current Outpatient Medications   Medication Sig Dispense Refill    simvastatin (ZOCOR) 40 MG Tab Take 40 mg by mouth.      mycophenolate (CELLCEPT) 500 MG tablet TAKE 1 TABLET BY MOUTH TWICE DAILY FOR 2 WEEKS THEN TAKE 2 TABLETS TWICE DAILY      furosemide (LASIX) 20 MG Tab Take 1 tablet by mouth once daily      Treprostinil (TYVASO) 0.6 MG/ML Solution Inhale.      potassium chloride SA (K-DUR) 10 MEQ Tab CR Take 10 mEq by mouth every day.      Home Care Oxygen Inhale continuous. 3 LNC 24 hours day      hydroCHLOROthiazide (HYDRODIURIL) 25 MG Tab Take 25 mg by mouth every day.      apixaban (ELIQUIS) 5mg Tab Take 5 mg by mouth.      losartan (COZAAR) 100 MG Tab Take 100 mg by mouth every day.      vitamin E 180 mg (400 units) Cap Take 180 mg by mouth every day.      acetaminophen (TYLENOL) 325 MG Tab Take 325 mg by mouth.      Pirfenidone 267 MG Tab Days 1 to 7: Oral: 267 mg 3 times daily; total daily dose: 801 mg/day. Days 8 to 14: Oral: 534 mg 3 times daily; total daily dose: 1,602 mg/day. Day 15 and thereafter: Oral: 801 mg 3 times daily; maximum daily dose: 2,403 mg/day. (Patient not taking: Reported on 1/26/2023)       No current facility-administered medications for this visit.     Past Medical History:   Diagnosis Date    Arrhythmia     6-7 years ago    Blood clotting disorder (HCC)     Eliquis    Breath shortness      "\"gradually over 20 years\"    Cancer (HCC)     Prostate    Hiatus hernia syndrome     6-7 years ago    High cholesterol     15 years ago    Hypertension     Cozaar    Malignant neoplasm of prostate (HCC) 2012    Other dyspnea and respiratory abnormality 2010    Oxygen dependent 2021    3 Liters    Pneumonia     60 years ago    Pneumonitis, hypersensitivity, sheela (HCC) 2016     Past Surgical History:   Procedure Laterality Date    RI COLONOSCOPY,DIAGNOSTIC N/A 2021    Procedure: COLONOSCOPY;  Surgeon: Lalit Negron M.D.;  Location: SURGERY SAME DAY [facility];  Service: Gastroenterology    OTHER CARDIAC SURGERY      Ablation    APPENDECTOMY      PROSTATECTOMY, RADICAL RETRO      TONSILLECTOMY       Social History     Tobacco Use    Smoking status: Former     Packs/day: 1.00     Years: 35.00     Pack years: 35.00     Types: Cigarettes, Cigars     Start date: 1950     Quit date: 1985     Years since quittin.0    Smokeless tobacco: Never    Tobacco comments:     Quit smoking twice during 34 years; final was    Vaping Use    Vaping Use: Never used   Substance Use Topics    Alcohol use: Yes     Comment: \"One or two measured 1 oz drinks with my wife before dinner.\"    Drug use: Never     Social History     Social History Narrative    Not on file       History reviewed. No pertinent family history.     ROS  See HPI      - NOTE: All other systems reviewed and are negative, except as in HPI.      Exam:    /78 (BP Location: Right arm, Patient Position: Sitting, BP Cuff Size: Adult)   Pulse 61   Temp 36.7 °C (98.1 °F) (Temporal)   Resp 18   Ht 1.74 m (5' 8.5\")   Wt 84.5 kg (186 lb 3.2 oz)   SpO2 96%  Body mass index is 27.9 kg/m².    General: Alert, pleasant, NAD  EYES:   PERRL, EOMI, no icterus or pallor.  Conjunctivae and lids normal.   HENT:  Normocephalic.  External ears normal.  No nasal drainage present.  Oropharynx non-erythematous, mucous membranes " moist.  Neck supple.   No thyromegaly or masses palpated. No cervical or supraclavicular lymphadenopathy.  Heart: Regular rate and rhythm.  S1 and S2 normal.  No murmurs appreciated.  Respiratory: Normal respiratory effort.  Clear to auscultation bilaterally.  3 to 4 L nasal cannula in place  Skin: Warm, dry, no rashes.  Musculoskeletal: Using front wheel walker.  Moves all extremities well.    Extremities: No clubbing, cyanosis or edema noted.   Neurological: No tremors, sensation grossly intact, tone/strength normal  Psych:  Affect/mood is normal, judgement is good, memory is intact, grooming is appropriate.      LABS: 1/12/2023: Results reviewed and discussed with the patient, questions answered.    Assessment/Plan:    1. ILD (interstitial lung disease) (HCC)  Continue with pulmonology    2. Pulmonary hypertension (HCC)  Continue with cardiology and pulmonology    3. Atrial fibrillation, unspecified type (HCC)  Continue cardiology    4. On home oxygen therapy  Continue with pulmonology    5. Benign essential hypertension  Continue with losartan, amlodipine, hydrochlorothiazide    6. Hyponatremia  Continue to monitor    7. Hyperlipidemia LDL goal <100  Continue atorvastatin     HCC Gap Form    Diagnosis to address: J84.9 - ILD (interstitial lung disease) (HCC)  Assessment and plan: Chronic, stable, as based on today's assessment and impact on other conditions evaluated today. Continue with current treatment plan: continue to use home oxygen, moved to the [facility] for better environment, uses cellcept, Tyvaso Follow-up with specialist as directed, but at least annually.  Diagnosis: I27.20 - Pulmonary hypertension (HCC)  Assessment and plan: Chronic, stable, as based on today's assessment and impact on other conditions evaluated today. Continue with current treatment plan: [facility] pulmonology, uses home oxygen 24 hours daily. Follow-up with specialist as directed, but at least annually.  Diagnosis: I48.91 - Atrial  fibrillation, unspecified type (HCC)  Assessment and plan: Chronic, stable, as based on today's assessment and impact on other conditions evaluated today. Continue with current treatment plan: Follows with Cardiology-currently takes eliquis Follow-up with specialist as directed, but at least annually.  Last edited 01/26/23 10:26 PST by NINA Jane.           Discussed with patient possible alternative diagnoses, patient is to take all medications as prescribed.     If symptoms persist FU w/PCP, if symptoms worsen go to emergency room.     If experiencing any side effects from prescribed medications reports to the office immediately or go to emergency room.    Reviewed indication, dosage, usage and potential adverse effects of prescribed medications.     Reviewed risks and benefits of treatment plan. Patient verbalizes understanding of all instruction and verbally agrees to plan.      Return in about 26 weeks (around 7/27/2023) for Can schedule for AWV after 7/26/2023.      Please note that this dictation was created using voice recognition software. I have made every reasonable attempt to correct obvious errors, but I expect that there are errors of grammar and possibly content that I did not discover before finalizing the note.

## 2023-01-26 NOTE — ASSESSMENT & PLAN NOTE
Chronic stable condition.    Takes Lasix 20 mg as needed as well as potassium 10 mEq as needed.  Labs completed on 1/12/2023 showed a sodium of 128.  Patient states that he has stopped taking his salt tablets

## 2023-01-26 NOTE — ASSESSMENT & PLAN NOTE
Chronic and stable condition.    History of ILD, pulmonary hypertension   Follows with pulmonology in [place] as well as at [facility]   Currently is on 24-hour oxygen 3 to 4 L nasal cannula he [place] use his oxygen

## 2023-01-26 NOTE — ASSESSMENT & PLAN NOTE
Chronic and stable condition.  Patient continues to take Suboxone 0.6 mg solution, Lasix and potassium as needed.  Patient follows with pulmonology in [place] as well as pulmonology at [facility].  Patient currently is on 3 to 4 L nasal cannula 24-hour oxygen.  Pirfenidone was finally authorized and will be able to use this and stay on Eliquis

## 2023-01-26 NOTE — ASSESSMENT & PLAN NOTE
Chronic and stable condition.  Patient currently takes simvastatin 40 mg daily.  Denies any myalgias, joint pain  Labs completed on 1/12/2023 show stable liver enzymes

## 2023-01-26 NOTE — ASSESSMENT & PLAN NOTE
Chronic and stable condition.    Blood pressure in office 130/78.    Patient currently takes Lasix 20 mg, hydrochlorothiazide 25 mg daily, losartan 100 mg daily   Denies headaches, lightheadedness, shortness of breath

## 2023-01-31 ENCOUNTER — OFFICE VISIT (OUTPATIENT)
Dept: MEDICAL GROUP | Facility: PHYSICIAN GROUP | Age: 83
End: 2023-01-31
Payer: MEDICARE

## 2023-01-31 VITALS
TEMPERATURE: 98.5 F | BODY MASS INDEX: 27.22 KG/M2 | DIASTOLIC BLOOD PRESSURE: 68 MMHG | SYSTOLIC BLOOD PRESSURE: 122 MMHG | OXYGEN SATURATION: 92 % | RESPIRATION RATE: 16 BRPM | HEIGHT: 69 IN | WEIGHT: 183.8 LBS | HEART RATE: 71 BPM

## 2023-01-31 DIAGNOSIS — H61.21 IMPACTED CERUMEN OF RIGHT EAR: ICD-10-CM

## 2023-01-31 PROCEDURE — 99212 OFFICE O/P EST SF 10 MIN: CPT | Performed by: NURSE PRACTITIONER

## 2023-01-31 ASSESSMENT — FIBROSIS 4 INDEX: FIB4 SCORE: 2.63

## 2023-01-31 NOTE — ASSESSMENT & PLAN NOTE
Chronic and stable condition  States decreased hearing in right ear  Left era able to visualize TM   Denies pain, balance issues

## 2023-01-31 NOTE — PROGRESS NOTES
"CC:  Chief Complaint   Patient presents with    Ear Drainage       HISTORY OF PRESENT ILLNESS: Patient is a 82 y.o. male established patient presenting     Impacted cerumen of right ear  Chronic and stable condition  States decreased hearing in right ear  Left era able to visualize TM   Denies pain, balance issues       Allergies:Patient has no known allergies.    Current Outpatient Medications   Medication Sig Dispense Refill    simvastatin (ZOCOR) 40 MG Tab Take 40 mg by mouth.      mycophenolate (CELLCEPT) 500 MG tablet TAKE 1 TABLET BY MOUTH TWICE DAILY FOR 2 WEEKS THEN TAKE 2 TABLETS TWICE DAILY      furosemide (LASIX) 20 MG Tab Take 1 tablet by mouth once daily      Treprostinil (TYVASO) 0.6 MG/ML Solution Inhale.      potassium chloride SA (K-DUR) 10 MEQ Tab CR Take 10 mEq by mouth every day.      Home Care Oxygen Inhale continuous. 3 LNC 24 hours day      hydroCHLOROthiazide (HYDRODIURIL) 25 MG Tab Take 25 mg by mouth every day.      apixaban (ELIQUIS) 5mg Tab Take 5 mg by mouth.      losartan (COZAAR) 100 MG Tab Take 100 mg by mouth every day.      vitamin E 180 mg (400 units) Cap Take 180 mg by mouth every day.      acetaminophen (TYLENOL) 325 MG Tab Take 325 mg by mouth.      Pirfenidone 267 MG Tab Days 1 to 7: Oral: 267 mg 3 times daily; total daily dose: 801 mg/day. Days 8 to 14: Oral: 534 mg 3 times daily; total daily dose: 1,602 mg/day. Day 15 and thereafter: Oral: 801 mg 3 times daily; maximum daily dose: 2,403 mg/day. (Patient not taking: Reported on 1/26/2023)       No current facility-administered medications for this visit.     Past Medical History:   Diagnosis Date    Arrhythmia     6-7 years ago    Blood clotting disorder (HCC)     Eliquis    Breath shortness     \"gradually over 20 years\"    Cancer (HCC) 2000    Prostate    Hiatus hernia syndrome     6-7 years ago    High cholesterol     15 years ago    Hypertension     Cozaar    Malignant neoplasm of prostate (HCC) 8/18/2012    Other dyspnea " "and respiratory abnormality 2010    Oxygen dependent 2021    3 Liters    Pneumonia     60 years ago    Pneumonitis, hypersensitivity, sheela (HCC) 2016     Past Surgical History:   Procedure Laterality Date    [place] COLONOSCOPY,DIAGNOSTIC N/A 2021    Procedure: COLONOSCOPY;  Surgeon: Lalit Negron M.D.;  Location: SURGERY SAME DAY [facility];  Service: Gastroenterology    OTHER CARDIAC SURGERY  2016    Ablation    APPENDECTOMY      PROSTATECTOMY, RADICAL RETRO      TONSILLECTOMY       Social History     Tobacco Use    Smoking status: Former     Packs/day: 1.00     Years: 35.00     Pack years: 35.00     Types: Cigarettes, Cigars     Start date: 1950     Quit date: 1985     Years since quittin.0    Smokeless tobacco: Never    Tobacco comments:     Quit smoking twice during 34 years; final was    Vaping Use    Vaping Use: Never used   Substance Use Topics    Alcohol use: Yes     Comment: \"One or two measured 1 oz drinks with my wife before dinner.\"    Drug use: Never     Social History     Social History Narrative    Not on file       History reviewed. No pertinent family history.     ROS    see HPI      - NOTE: All other systems reviewed and are negative, except as in HPI.      Exam:    /68 (BP Location: Right arm, Patient Position: Sitting, BP Cuff Size: Adult)   Pulse 71   Temp 36.9 °C (98.5 °F) (Temporal)   Resp 16   Ht 1.74 m (5' 8.5\")   Wt 83.4 kg (183 lb 12.8 oz)   SpO2 92%  Body mass index is 27.54 kg/m².    General: Alert, pleasant, NAD  EYES:   PERRL, EOMI, no icterus or pallor.  Conjunctivae and lids normal.   HENT:  Normocephalic.  External ears normal. After irrigation on right ear Tympanic membranes pearly, opaque.    Respiratory: Normal respiratory effort.  Using 3 LNC  Musculoskeletal: Gait is normal.  Moves all extremities well.    Extremities: No clubbing, cyanosis or edema noted.   Neurological: No tremors, sensation grossly intact, tone/strength " normal, gait is normal.  Psych:  Affect/mood is normal, judgement is good, memory is intact, grooming is appropriate.      Assessment/Plan:    1. Impacted cerumen of right ear  MA irrigated   I visualized TM        Discussed with patient possible alternative diagnoses, patient is to take all medications as prescribed.     If symptoms persist FU w/PCP, if symptoms worsen go to emergency room.     If experiencing any side effects from prescribed medications reports to the office immediately or go to emergency room.    Reviewed indication, dosage, usage and potential adverse effects of prescribed medications.     Reviewed risks and benefits of treatment plan. Patient verbalizes understanding of all instruction and verbally agrees to plan.      Return in about 49 weeks (around 1/9/2024) for AWV.      Please note that this dictation was created using voice recognition software. I have made every reasonable attempt to correct obvious errors, but I expect that there are errors of grammar and possibly content that I did not discover before finalizing the note.

## 2023-03-02 ENCOUNTER — HOSPITAL ENCOUNTER (OUTPATIENT)
Dept: RADIOLOGY | Facility: MEDICAL CENTER | Age: 83
End: 2023-03-02
Attending: INTERNAL MEDICINE
Payer: MEDICARE

## 2023-03-02 DIAGNOSIS — C4A.31: ICD-10-CM

## 2023-03-02 PROCEDURE — 700117 HCHG RX CONTRAST REV CODE 255: Performed by: INTERNAL MEDICINE

## 2023-03-02 PROCEDURE — 70491 CT SOFT TISSUE NECK W/DYE: CPT

## 2023-03-02 RX ADMIN — IOHEXOL 75 ML: 350 INJECTION, SOLUTION INTRAVENOUS at 13:38

## 2023-03-23 ENCOUNTER — HOSPITAL ENCOUNTER (OUTPATIENT)
Dept: RADIOLOGY | Facility: MEDICAL CENTER | Age: 83
End: 2023-03-23
Attending: INTERNAL MEDICINE
Payer: MEDICARE

## 2023-03-23 DIAGNOSIS — C4A.31: ICD-10-CM

## 2023-03-23 PROCEDURE — A9552 F18 FDG: HCPCS

## 2023-05-09 ENCOUNTER — OFFICE VISIT (OUTPATIENT)
Dept: MEDICAL GROUP | Facility: PHYSICIAN GROUP | Age: 83
End: 2023-05-09
Payer: MEDICARE

## 2023-05-09 VITALS
HEIGHT: 69 IN | WEIGHT: 183.4 LBS | OXYGEN SATURATION: 94 % | SYSTOLIC BLOOD PRESSURE: 110 MMHG | RESPIRATION RATE: 16 BRPM | BODY MASS INDEX: 27.16 KG/M2 | HEART RATE: 71 BPM | TEMPERATURE: 99.1 F | DIASTOLIC BLOOD PRESSURE: 78 MMHG

## 2023-05-09 DIAGNOSIS — B36.9 FUNGAL INFECTION OF SKIN: ICD-10-CM

## 2023-05-09 DIAGNOSIS — C4A.31 MERKEL CELL CARCINOMA OF NOSE (HCC): ICD-10-CM

## 2023-05-09 PROCEDURE — 99213 OFFICE O/P EST LOW 20 MIN: CPT | Performed by: NURSE PRACTITIONER

## 2023-05-09 RX ORDER — VITAMIN E 268 MG
180 CAPSULE ORAL DAILY
COMMUNITY

## 2023-05-09 RX ORDER — POTASSIUM CHLORIDE 750 MG/1
10 TABLET, FILM COATED, EXTENDED RELEASE ORAL DAILY
COMMUNITY

## 2023-05-09 RX ORDER — MYCOPHENOLATE MOFETIL 500 MG/1
2 TABLET ORAL 2 TIMES DAILY
COMMUNITY
Start: 2023-02-24 | End: 2024-02-26

## 2023-05-09 RX ORDER — NYSTATIN 100000 U/G
1 CREAM TOPICAL 2 TIMES DAILY
Qty: 30 G | Refills: 1 | Status: SHIPPED | OUTPATIENT
Start: 2023-05-09 | End: 2024-02-26

## 2023-05-09 RX ORDER — DIAZEPAM 5 MG/1
TABLET ORAL
COMMUNITY
Start: 2023-03-16 | End: 2024-02-26

## 2023-05-09 ASSESSMENT — ENCOUNTER SYMPTOMS
ROS SKIN COMMENTS: BETWEEN BUTTOCKS
CHILLS: 0
FEVER: 0
SHORTNESS OF BREATH: 0

## 2023-05-09 ASSESSMENT — FIBROSIS 4 INDEX: FIB4 SCORE: 2.63

## 2023-05-10 NOTE — PROGRESS NOTES
"CC:  Chief Complaint   Patient presents with    Rash     X3wk gluteal       HISTORY OF PRESENT ILLNESS: Patient is a 82 y.o. male established patient presenting     Problem   Fungal Infection of Skin    Patient states onset was roughly 3 to 4 weeks ago.  He states that he has been using rubbing alcohol as well as hydrocortisone 1% with minimal improvement.  He denies any pain or any discomfort with bowel movements.  He denies that this is ever occurred in the past.  He notes redness and slight discomfort to area however denies any drainage or weeping     Merkel Cell Carcinoma of Nose (Hcc)    Patient was recently seen by dermatology due to a sore that was on his right nose below his eyeglasses.  He states that he had a Mohs procedure completed and it was noted to be Merkel cell carcinoma.  He then followed up with an oncologist who told him that he needed to continue to monitor and be seen quarterly by dermatology.  I did provide him with possible options for radiation of site however patient declined this.  PET scan was completed which did not show any further cancer any rash on his body  Patient continues to follow with Derm           Review of Systems   Constitutional:  Negative for chills and fever.   Respiratory:  Negative for shortness of breath.    Cardiovascular:  Negative for chest pain.   Skin:  Positive for rash.        Between buttocks           - NOTE: All other systems reviewed and are negative, except as in HPI.      Exam:    /78 (BP Location: Left arm, Patient Position: Sitting, BP Cuff Size: Adult)   Pulse 71   Temp 37.3 °C (99.1 °F) (Temporal)   Resp 16   Ht 1.74 m (5' 8.5\")   Wt 83.2 kg (183 lb 6.4 oz)   SpO2 94%  Body mass index is 27.48 kg/m².    Physical Exam  Constitutional:       Appearance: Normal appearance. He is normal weight.   Pulmonary:      Effort: Pulmonary effort is normal.      Comments: Portable oxygenation  Skin:     General: Skin is warm.      Capillary Refill: " Capillary refill takes less than 2 seconds.      Findings: Rash is not crusting, macular, nodular, papular, purpuric, pustular, scaling, urticarial or vesicular.          Neurological:      Mental Status: He is alert.         Assessment/Plan:    1. Fungal infection of skin  Acute uncomplicated condition  - nystatin (MYCOSTATIN) 498365 UNIT/GM Cream topical cream; Apply 1 g topically 2 times a day.  Dispense: 30 g; Refill: 1  If no improvement noted we will start on a triamcinolone if needed    2. Merkel cell carcinoma of nose (HCC)  Chronic and stable condition   continue to follow with dermatology         Discussed with patient possible alternative diagnoses, patient is to take all medications as prescribed.     If symptoms persist FU w/PCP, if symptoms worsen go to emergency room.     If experiencing any side effects from prescribed medications reports to the office immediately or go to emergency room.    Reviewed indication, dosage, usage and potential adverse effects of prescribed medications.     Reviewed risks and benefits of treatment plan. Patient verbalizes understanding of all instruction and verbally agrees to plan.      Return for 2 to 3 weeks if no improvement.      Please note that this dictation was created using voice recognition software. I have made every reasonable attempt to correct obvious errors, but I expect that there are errors of grammar and possibly content that I did not discover before finalizing the note.

## 2023-09-06 ENCOUNTER — TELEPHONE (OUTPATIENT)
Dept: HEALTH INFORMATION MANAGEMENT | Facility: OTHER | Age: 83
End: 2023-09-06
Payer: MEDICARE

## 2023-09-06 DIAGNOSIS — J84.9 ILD (INTERSTITIAL LUNG DISEASE) (HCC): ICD-10-CM

## 2023-09-06 DIAGNOSIS — Z99.81 ON HOME OXYGEN THERAPY: ICD-10-CM

## 2023-09-06 DIAGNOSIS — I27.20 PULMONARY HYPERTENSION (HCC): ICD-10-CM

## 2023-09-06 NOTE — TELEPHONE ENCOUNTER
1. Caller Name: Pablo Kraus                         Call Back Number: [phone]       How would the patient prefer to be contacted with a response: MyChart message    2. SPECIFIC Action To Be Taken: Orders pending, please sign.    3. Diagnosis/Clinical Reason for Request:     4. Specialty & Provider Name/Lab/Imaging Location: Dr Jacklyn Garcia MD Pulmonary Medicine     5. Is appointment scheduled for requested order/referral: no    Patient was not informed they will receive a return phone call from the office ONLY if there are any questions before processing their request. Advised to call back if they haven't received a call from the referral department in 5 days.

## 2023-10-03 ENCOUNTER — HOSPITAL ENCOUNTER (OUTPATIENT)
Dept: LAB | Facility: MEDICAL CENTER | Age: 83
End: 2023-10-03
Attending: RADIOLOGY
Payer: MEDICARE

## 2023-10-03 LAB
BUN SERPL-MCNC: 12 MG/DL (ref 8–22)
CREAT SERPL-MCNC: 0.66 MG/DL (ref 0.5–1.4)
GFR SERPLBLD CREATININE-BSD FMLA CKD-EPI: 93 ML/MIN/1.73 M 2

## 2023-10-03 PROCEDURE — 84520 ASSAY OF UREA NITROGEN: CPT

## 2023-10-03 PROCEDURE — 82565 ASSAY OF CREATININE: CPT

## 2023-10-03 PROCEDURE — 36415 COLL VENOUS BLD VENIPUNCTURE: CPT

## 2023-10-13 ENCOUNTER — APPOINTMENT (OUTPATIENT)
Dept: RADIOLOGY | Facility: IMAGING CENTER | Age: 83
End: 2023-10-13
Attending: PHYSICIAN ASSISTANT
Payer: MEDICARE

## 2023-10-13 ENCOUNTER — OFFICE VISIT (OUTPATIENT)
Dept: URGENT CARE | Facility: CLINIC | Age: 83
End: 2023-10-13
Payer: MEDICARE

## 2023-10-13 VITALS
BODY MASS INDEX: 27.28 KG/M2 | OXYGEN SATURATION: 92 % | HEIGHT: 68 IN | TEMPERATURE: 97.8 F | DIASTOLIC BLOOD PRESSURE: 76 MMHG | WEIGHT: 180 LBS | HEART RATE: 84 BPM | SYSTOLIC BLOOD PRESSURE: 128 MMHG | RESPIRATION RATE: 16 BRPM

## 2023-10-13 DIAGNOSIS — S49.91XA INJURY OF RIGHT SHOULDER, INITIAL ENCOUNTER: ICD-10-CM

## 2023-10-13 DIAGNOSIS — S40.021A CONTUSION OF RIGHT UPPER EXTREMITY, INITIAL ENCOUNTER: ICD-10-CM
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
## 2023-10-13 PROCEDURE — 99203 OFFICE O/P NEW LOW 30 MIN: CPT | Performed by: PHYSICIAN ASSISTANT

## 2023-10-13 PROCEDURE — 3078F DIAST BP <80 MM HG: CPT | Performed by: PHYSICIAN ASSISTANT

## 2023-10-13 PROCEDURE — 73030 X-RAY EXAM OF SHOULDER: CPT | Mod: TC,FY,RT | Performed by: PHYSICIAN ASSISTANT

## 2023-10-13 PROCEDURE — 3074F SYST BP LT 130 MM HG: CPT | Performed by: PHYSICIAN ASSISTANT

## 2023-10-13 PROCEDURE — 73060 X-RAY EXAM OF HUMERUS: CPT | Mod: TC,FY,RT | Performed by: PHYSICIAN ASSISTANT

## 2023-10-13 ASSESSMENT — FIBROSIS 4 INDEX: FIB4 SCORE: 2.63

## 2023-10-13 ASSESSMENT — ENCOUNTER SYMPTOMS
FALLS: 1
SHORTNESS OF BREATH: 0

## 2023-10-13 NOTE — PROGRESS NOTES
Subjective     Pablo Kraus is a 82 y.o. male who presents with Shoulder Injury and Fall        HPI    This is a new problem.   The patient presents to clinic with his wife secondary to an injury of his right shoulder secondary to a mechanical ground-level fall x2 days ago.  The patient states that he was unhooking the hoses from his house for anticipating of a deep freeze. The patient states he was wearing loose fitting slippers while walking around the outside of the house. The patient states he was walking over some decorative rocks when his slipper got caught causing him to fall onto his right side. The patient states he landed onto his right shoulder causing an abrasion. The patient reports no head injury.  No LOC.  The patient states he also sustained an abrasion to his right lower leg, but states this is healing well without difficulty.  The patient states he is experiencing pain to the right shoulder and right upper arm.  The patient states he did have a knot to his right upper arm near the abrasion, but states this has subsided.  The patient states he is experiencing decreased range of motion of the right shoulder.  He reports no radiation of pain.  He also reports no numbness, tingling, weakness of the right upper extremity.  The patient denies chest pain and or shortness of breath.  The patient is taken OTC Tylenol for his current symptoms.  He has also applied ice and heat to the injured areas.  The patient reports no additional injuries as result of the fall.    PMH:  has a past medical history of Arrhythmia, Blood clotting disorder ([facility]), Breath shortness, Cancer ([facility]) (2000), Hiatus hernia syndrome, High cholesterol, Hypertension, Malignant neoplasm of prostate ([facility]) (8/18/2012), Other dyspnea and respiratory abnormality (12/16/2010), Oxygen dependent (04/20/2021), Pneumonia, and Pneumonitis, hypersensitivity, sheela ([facility]) (9/28/2016).  MEDS:   Current Outpatient Medications:     potassium chloride  ER (KLOR-CON) 10 MEQ tablet, Take 10 mEq by mouth every day., Disp: , Rfl:     vitamin e (VITAMIN E) 400 UNIT Cap, Take 180 mg by mouth every day., Disp: , Rfl:     Pirfenidone 267 MG Tab, , Disp: , Rfl:     simvastatin (ZOCOR) 40 MG Tab, Take 40 mg by mouth., Disp: , Rfl:     furosemide (LASIX) 20 MG Tab, Take 1 tablet by mouth once daily, Disp: , Rfl:     Treprostinil (TYVASO) 0.6 MG/ML Solution, Inhale., Disp: , Rfl:     potassium chloride SA (K-DUR) 10 MEQ Tab CR, Take 10 mEq by mouth every day., Disp: , Rfl:     Home Care Oxygen, Inhale continuous. 3 LNC 24 hours day, Disp: , Rfl:     hydroCHLOROthiazide (HYDRODIURIL) 25 MG Tab, Take 25 mg by mouth every day., Disp: , Rfl:     apixaban (ELIQUIS) 5mg Tab, Take 5 mg by mouth., Disp: , Rfl:     losartan (COZAAR) 100 MG Tab, Take 100 mg by mouth every day., Disp: , Rfl:     vitamin E 180 mg (400 units) Cap, Take 180 mg by mouth every day., Disp: , Rfl:     acetaminophen (TYLENOL) 325 MG Tab, Take 325 mg by mouth., Disp: , Rfl:     diazePAM (VALIUM) 5 MG Tab, TAKE 1 TABLET BY MOUTH 30 MINUTES PRIOR TO PROCEDURE, MAY REPEAT ONCE (Patient not taking: Reported on 5/9/2023), Disp: , Rfl:     mycophenolate (CELLCEPT) 500 MG tablet, Take 2 Tablets by mouth 2 times a day., Disp: , Rfl:     nystatin (MYCOSTATIN) 431927 UNIT/GM Cream topical cream, Apply 1 g topically 2 times a day., Disp: 30 g, Rfl: 1    mycophenolate (CELLCEPT) 500 MG tablet, TAKE 1 TABLET BY MOUTH TWICE DAILY FOR 2 WEEKS THEN TAKE 2 TABLETS TWICE DAILY, Disp: , Rfl:   ALLERGIES: No Known Allergies  SURGHX:   Past Surgical History:   Procedure Laterality Date    IA COLONOSCOPY,DIAGNOSTIC N/A 4/27/2021    Procedure: COLONOSCOPY;  Surgeon: Lalit Negron M.D.;  Location: SURGERY SAME DAY [facility];  Service: Gastroenterology    OTHER CARDIAC SURGERY  2016    Ablation    APPENDECTOMY      PROSTATECTOMY, RADICAL RETRO      TONSILLECTOMY       SOCHX:  reports that he quit smoking about 38 years ago. His  "smoking use included cigarettes and cigars. He started smoking about 73 years ago. He has a 35.0 pack-year smoking history. He has never used smokeless tobacco. He reports current alcohol use. He reports that he does not use drugs.  FH: Family history was reviewed, no pertinent findings to report    Review of Systems   Respiratory:  Negative for shortness of breath.    Cardiovascular:  Negative for chest pain.   Musculoskeletal:  Positive for falls and joint pain.   All other systems reviewed and are negative.             Objective     /76 (BP Location: Right arm, Patient Position: Sitting, BP Cuff Size: Adult long)   Pulse 84   Temp 36.6 °C (97.8 °F) (Temporal)   Resp 16   Ht 1.727 m (5' 8\")   Wt 81.6 kg (180 lb)   SpO2 92%   BMI 27.37 kg/m²      Physical Exam  Constitutional:       General: He is not in acute distress.     Appearance: Normal appearance. He is well-developed. He is not ill-appearing.   HENT:      Head: Normocephalic and atraumatic.      Right Ear: External ear normal.      Left Ear: External ear normal.   Eyes:      Extraocular Movements: Extraocular movements intact.      Conjunctiva/sclera: Conjunctivae normal.   Cardiovascular:      Rate and Rhythm: Normal rate.   Pulmonary:      Effort: Pulmonary effort is normal.   Musculoskeletal:      Cervical back: Normal range of motion and neck supple.      Comments:   Right Shoulder:  No tenderness to palpation to the right shoulder.  No tenderness overlying the right clavicle.  No tenderness overlying the posterior aspect.  No tenderness overlying the anterior aspect.  No tenderness overlying the proximal humerus.  No obvious deformity.  A localized area of tenderness is present to the mid upper arm with an associated superficial abrasion with scabbing in place.  A localized area of ecchymosis is present inferior to the abrasion.  No surrounding erythema.  No increased warmth.  Decreased ROM -patient measures limited range of motion of the " right shoulder  Neurovascular intact distally  Radial pulse 2+  Strength 5/5 -equal bilateral upper extremities   Skin:     General: Skin is warm and dry.   Neurological:      Mental Status: He is alert and oriented to person, place, and time.               Progress:   Right Shoulder XR:  COMPARISON: None     FINDINGS:  MINERALIZATION: Mineralization is unremarkable for age.     INJURY: No acute fracture or gross malalignment is seen.     JOINTS: No erosive arthropathy is evident.  Degenerative spurring of the acromioclavicular joint.     Hazy opacities throughout the right lung.     IMPRESSION:  1.  No radiographic evidence of acute traumatic injury.  2.  Degenerative spurring of the acromioclavicular joint.  3.  Hazy infiltrates and/or edema of the right lung.    Right Humerus XR:  COMPARISON: None     FINDINGS:  There is no fracture or dislocation.  The visualized osseous structures are in anatomic alignment.  Joint spaces are preserved.  Bone mineralization is decreased..  There are nonspecific interstitial opacities in the right lung     IMPRESSION:  No acute osseous abnormality.     Reviewed x-ray results with the patient and his wife in clinic.            Assessment & Plan      1. Fall, initial encounter    2. Injury of right shoulder, initial encounter  - DX-SHOULDER 2+ RIGHT; Future  - DX-HUMERUS 2+ RIGHT; Future    3. Contusion of right upper extremity, initial encounter    Differential diagnoses, supportive care, and indications for immediate follow-up discussed with patient.   Instructed to return to clinic or nearest emergency department for any change in condition, further concerns, or worsening of symptoms.    OTC Tylenol for pain/discomfort   Apply ice and/or heat to the affected areas  Follow-up with PCP   Return to clinic or go tot he ED if symptoms worsen or fail to improve, or if the patient should develop worsening/increasing pain/tenderness, swelling, bruising, redness or warmth to the affected  area, decreased ROM, numbness, tingling or weakness, chest pain, shortness of breath, fever/chills, and/or any concerning symptoms.     Discussed plan with the patient, and he agrees to the above.    I personally reviewed prior external notes and test results pertinent to today's visit.  I have independently reviewed and interpreted all diagnostics ordered during this urgent care visit.     Please note that this dictation was created using voice recognition software. I have made every reasonable attempt to correct obvious errors, but I expect that there may be errors of grammar and possibly content that I did not discover before finalizing the note.       This note was electronically signed by Pam Beckwith PA-C

## 2023-10-17 ENCOUNTER — HOSPITAL ENCOUNTER (OUTPATIENT)
Dept: RADIOLOGY | Facility: MEDICAL CENTER | Age: 83
End: 2023-10-17
Attending: RADIOLOGY
Payer: MEDICARE

## 2023-10-17 DIAGNOSIS — C4A.31: ICD-10-CM

## 2023-10-17 PROCEDURE — 700117 HCHG RX CONTRAST REV CODE 255: Performed by: RADIOLOGY

## 2023-10-17 PROCEDURE — 70491 CT SOFT TISSUE NECK W/DYE: CPT

## 2023-10-17 RX ADMIN — IOHEXOL 80 ML: 350 INJECTION, SOLUTION INTRAVENOUS at 09:11

## 2023-11-21 ENCOUNTER — OFFICE VISIT (OUTPATIENT)
Dept: URGENT CARE | Facility: CLINIC | Age: 83
End: 2023-11-21
Payer: MEDICARE

## 2023-11-21 VITALS
HEIGHT: 68 IN | DIASTOLIC BLOOD PRESSURE: 58 MMHG | BODY MASS INDEX: 26.52 KG/M2 | HEART RATE: 70 BPM | SYSTOLIC BLOOD PRESSURE: 114 MMHG | OXYGEN SATURATION: 96 % | RESPIRATION RATE: 18 BRPM | WEIGHT: 175 LBS | TEMPERATURE: 97.8 F

## 2023-11-21 DIAGNOSIS — R04.0 EPISTAXIS: ICD-10-CM

## 2023-11-21 DIAGNOSIS — Z79.01 CHRONIC ANTICOAGULATION: ICD-10-CM

## 2023-11-21 PROCEDURE — 3074F SYST BP LT 130 MM HG: CPT | Performed by: PHYSICIAN ASSISTANT

## 2023-11-21 PROCEDURE — 3078F DIAST BP <80 MM HG: CPT | Performed by: PHYSICIAN ASSISTANT

## 2023-11-21 PROCEDURE — 99213 OFFICE O/P EST LOW 20 MIN: CPT | Performed by: PHYSICIAN ASSISTANT

## 2023-11-21 ASSESSMENT — FIBROSIS 4 INDEX: FIB4 SCORE: 2.66

## 2023-11-22 NOTE — PROGRESS NOTES
"Subjective:   Pablo Kraus is a 83 y.o. male who presents for Epistaxis (X Today, off and on, on 3 L of Oxigen, no know injury )  This is a very pleasant 83-year-old male who presents with epistaxis throughout today.  He is on Eliquis.  He has had frequent epistaxis which typically resolves on its own.  He reports now the bleeding has stopped while waiting in our waiting room.  He denies any lightheadedness dizziness nausea or vomiting.  He has tried applying pressure.      Medications:  acetaminophen Tabs  apixaban Tabs  diazePAM Tabs  furosemide Tabs  Home Care Oxygen  hydroCHLOROthiazide Tabs  losartan Tabs  mycophenolate  nystatin Crea  Pirfenidone Tabs  potassium chloride ER  potassium chloride SA Tbcr  simvastatin Tabs  Tyvaso Soln  vitamin e Caps    Allergies:             Patient has no known allergies.    Surgical History:         Past Surgical History:   Procedure Laterality Date    DE COLONOSCOPY,DIAGNOSTIC N/A 4/27/2021    Procedure: COLONOSCOPY;  Surgeon: Lalit Negron M.D.;  Location: SURGERY SAME DAY [facility];  Service: Gastroenterology    OTHER CARDIAC SURGERY  2016    Ablation    APPENDECTOMY      PROSTATECTOMY, RADICAL RETRO      TONSILLECTOMY         Past Social Hx:  Pablo Kraus  reports that he quit smoking about 38 years ago. His smoking use included cigarettes and cigars. He started smoking about 73 years ago. He has a 35.0 pack-year smoking history. He has never used smokeless tobacco. He reports that he does not currently use alcohol. He reports that he does not use drugs.     Past Family Hx:   Pablo Kraus family history is not on file.       Problem list, medications, and allergies reviewed by myself today in Epic.     Objective:     /58 (BP Location: Left arm, Patient Position: Sitting, BP Cuff Size: Adult)   Pulse 70   Temp 36.6 °C (97.8 °F) (Temporal)   Resp 18   Ht 1.727 m (5' 8\")   Wt 79.4 kg (175 lb)   SpO2 96%   BMI 26.61 kg/m²     Physical Exam  Vitals " and nursing note reviewed.   Constitutional:       General: He is not in acute distress.     Appearance: Normal appearance. He is not ill-appearing, toxic-appearing or diaphoretic.   HENT:      Head: Normocephalic.      Right Ear: External ear normal.      Left Ear: External ear normal.      Nose:      Left Nostril: Epistaxis present. No septal hematoma or occlusion.      Comments: At the time of evaluation epistaxis has stopped.  Patient was encouraged to blow lightly x 2-3 times to stability of clot.  After 2 blows a clot remains in place along the nasal septum.  Patient was monitored for an additional 20 to 30 minutes with no residual bleeding.     Mouth/Throat:      Mouth: Mucous membranes are moist.   Eyes:      Extraocular Movements: Extraocular movements intact.      Conjunctiva/sclera: Conjunctivae normal.      Pupils: Pupils are equal, round, and reactive to light.   Cardiovascular:      Rate and Rhythm: Normal rate.      Pulses: Normal pulses.   Pulmonary:      Effort: Pulmonary effort is normal. No respiratory distress.   Musculoskeletal:      Cervical back: Normal range of motion.   Skin:     General: Skin is warm.      Findings: No lesion or rash.   Neurological:      General: No focal deficit present.      Mental Status: He is alert and oriented to person, place, and time.   Psychiatric:         Thought Content: Thought content normal.         Judgment: Judgment normal.         Assessment/Plan:     Diagnosis and Associated Orders:     1. Epistaxis    2. Chronic anticoagulation        Comments/MDM:  Patient presents with epistaxis.  The epistaxis stopped prior to our visit here.  Patient was encouraged to gently blow x 2.  Clot remained stable.  No residual bleeding after monitoring for an additional 30 minutes.  Patient advised in future if recurrence, gentle blow, Anson-Synephrine or oxymetazoline spray with clamping x 15 minutes, ice.  Advised nasal saline as patient is on chronic oxygen to keep mucous  membranes moist.  May try applying topical petroleum to nasal septum at night.  Follow-up in ER if bleeding persists.  Vital signs stable and reassuring today in the clinic.  I personally reviewed prior external notes and test results pertinent to today's visit. Supportive care, natural history, differential diagnoses, and indications for immediate follow-up discussed. Return to clinic or go to ED if symptoms worsen or persist.  Red flag symptoms discussed.  Patient/Parent/Guardian voices understanding. Follow-up with your primary care provider in 3-5 days.  All side effects of medication discussed including allergic response, GI upset, tendon injury, rash, sedation etc    Please note that this dictation was created using voice recognition software. I have made a reasonable attempt to correct obvious errors, but I expect that there are errors of grammar and possibly content that I did not discover before finalizing the note.    This note was electronically signed by Dia Ledesma PA-C

## 2023-11-29 ENCOUNTER — PATIENT MESSAGE (OUTPATIENT)
Dept: HEALTH INFORMATION MANAGEMENT | Facility: OTHER | Age: 83
End: 2023-11-29

## 2024-02-17 ASSESSMENT — ENCOUNTER SYMPTOMS
HEMOPTYSIS: 0
WHEEZING: 0
DYSPNEA AT REST: 0
CHEST TIGHTNESS: 0
SHORTNESS OF BREATH: 1

## 2024-02-19 NOTE — PROGRESS NOTES
Pulmonary Hypertension Clinic- Initial Consult    Date of Service: 2/20/24    Referring Physician: Mary Diggs A.P.*    Reason for Consult: ILD/Pulmonary Hypertension    Chief Complaint:   Chief Complaint   Patient presents with    Establish Care     NP/ REF BY [facility] /  PULM IN [place]/ DX: ILD (interstitial lung disease        HPI:   Pablo Kraus is a 83 y.o. male who is followed by Dr. Anderson in [place] and is referred to the pulmonary clinic for ILD/Pulmonary hypertension.  He has been assessed and managed by ILD/PH specialists at [facility] Drew Kimble and Yury Mcintosh. He is on Pirfenidone and Tyvaso (16 puffs QID) and states he tolerates these medications well.     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  He then avoided contact with birds and moved to [facility] where he reports he was asymptomatic for many years.  He does endorse about 5 times throughout his adult life where he had very high fevers (once in [place]) and was admitted to hospital for a short time but never told what his diagnosis was.  He states he was well (hiking and climbing) until about 6 years ago (2018) when he took a trip to [place] and had a flare of his lung disease which set him on a downward trajectory that he continues on now.  He is living in [facility] and would like to move his care from [place] and [facility] to [facility] for ease of getting to and from appointments.     He has had a diagnosis from Dr. Anderson, confirmed by Dr. 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  He is using 3L of oxygen today.      Per our Care Everywhere records it appears he started to see DR. 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  He was started on Cellcept at that time.  In 12/2020 he was noted to be fluid overloaded and concern  "for PH triggered a referral to Dr. Mcintosh. AT that time he was on cellcept 1000mg BID.    He saw DR. Mcintosh in March 2021 and was diagnosed with PH via RHC showing precapillary PH.  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     6/21 - 2-3LPM supplemental O2, still on cellcept, on 8 breaths QID of Tyvaso.      2/22 - Titrated up to 15 puffs QID with significant symptomatic improvement.  RVSP down to 40.     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 3-4 LPM supplemental O2.     12/23 Last saw Dr. 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     Functional status:  NYHA Class:   I: no symptoms with activity. Normal  II. Mild symptoms with normal physical activity and comfortable at rest.  III: Moderate symptoms with less than normal physical activity. Only comfortable at rest  IV: Severe symptoms with symptoms of heart failure, even at rest.    Pulmonary Hypertension Workup:  Echo: 2021: Normal EF, RAP 8, RVSP 50mmHg  RHC: (4/5/2021) RA 9 PA 47/18 (31) PCW 15 CO 4.57 PVR 3.5            V/Q Scan/CTPA:  HRCT: None available, CT scans in PACS are empty files  CTD Panel: negative at [facility]        Past Medical History:   Diagnosis Date    Arrhythmia     6-7 years ago    Blood clotting disorder (HCC)     Eliquis    Breath shortness     \"gradually over 20 years\"    Cancer (HCC) 2000    Prostate    Hiatus hernia syndrome     6-7 years ago    High cholesterol     15 years ago    Hypertension     Cozaar    Malignant neoplasm of prostate (HCC) 08/18/2012    Other dyspnea and respiratory abnormality 12/16/2010    Oxygen dependent 04/20/2021    3 Liters    Pneumonia     60 years ago    Pneumonitis, hypersensitivity, sheela (HCC) 09/28/2016    Shortness of breath        Past Surgical History:   Procedure Laterality Date " "   OR COLONOSCOPY,DIAGNOSTIC N/A 2021    Procedure: COLONOSCOPY;  Surgeon: Lalit Negron M.D.;  Location: SURGERY SAME DAY [facility];  Service: Gastroenterology    OTHER CARDIAC SURGERY  2016    Ablation    APPENDECTOMY      PROSTATECTOMY, RADICAL RETRO      TONSILLECTOMY         Social History     Socioeconomic History    Marital status:      Spouse name: Not on file    Number of children: Not on file    Years of education: Not on file    Highest education level: Associate degree: academic program   Occupational History    Not on file   Tobacco Use    Smoking status: Former     Current packs/day: 0.00     Average packs/day: 1 pack/day for 35.0 years (35.0 ttl pk-yrs)     Types: Cigarettes, Cigars     Start date: 1950     Quit date: 1985     Years since quittin.1    Smokeless tobacco: Never    Tobacco comments:     Quit smoking twice during 34 years; final was    Vaping Use    Vaping Use: Never used   Substance and Sexual Activity    Alcohol use: Not Currently     Comment: \"One or two measured 1 oz drinks with my wife before dinner.\"    Drug use: Never    Sexual activity: Not on file   Other Topics Concern    Not on file   Social History Narrative    Not on file     Social Determinants of Health     Financial Resource Strain: Low Risk  (3/19/2022)    Overall Financial Resource Strain (CARDIA)     Difficulty of Paying Living Expenses: Not hard at all   Food Insecurity: No Food Insecurity (2021)    Hunger Vital Sign     Worried About Running Out of Food in the Last Year: Never true     Ran Out of Food in the Last Year: Never true   Transportation Needs: No Transportation Needs (2021)    PRAPARE - Transportation     Lack of Transportation (Medical): No     Lack of Transportation (Non-Medical): No   Physical Activity: Insufficiently Active (3/19/2022)    Exercise Vital Sign     Days of Exercise per Week: 4 days     Minutes of Exercise per Session: 30 min   Stress: No Stress " Concern Present (3/19/2022)    [place] [employer] - Occupational Stress Questionnaire     Feeling of Stress : Not at all   Social Connections: Moderately Integrated (3/19/2022)    Social Connection and Isolation Panel [NHANES]     Frequency of Communication with Friends and Family: More than three times a week     Frequency of Social Gatherings with Friends and Family: Twice a week     Attends Tenriism Services: Never     Active Member of Clubs or Organizations: Yes     Attends Club or Organization Meetings: 1 to 4 times per year     Marital Status:    Intimate Partner Violence: Not on file   Housing Stability: Low Risk  (3/19/2022)    Housing Stability Vital Sign     Unable to Pay for Housing in the Last Year: No     Number of Places Lived in the Last Year: 1     Unstable Housing in the Last Year: No          No family history on file.    Current Outpatient Medications on File Prior to Visit   Medication Sig Dispense Refill    potassium chloride ER (KLOR-CON) 10 MEQ tablet Take 10 mEq by mouth every day.      vitamin e (VITAMIN E) 400 UNIT Cap Take 180 mg by mouth every day.      Pirfenidone 267 MG Tab       simvastatin (ZOCOR) 40 MG Tab Take 40 mg by mouth.      furosemide (LASIX) 20 MG Tab Take 1 tablet by mouth once daily      Treprostinil (TYVASO) 0.6 MG/ML Solution Inhale.      Home Care Oxygen Inhale continuous. 3 LNC 24 hours day      hydroCHLOROthiazide (HYDRODIURIL) 25 MG Tab Take 25 mg by mouth every day.      apixaban (ELIQUIS) 5mg Tab Take 5 mg by mouth.      losartan (COZAAR) 100 MG Tab Take 100 mg by mouth every day.      diazePAM (VALIUM) 5 MG Tab TAKE 1 TABLET BY MOUTH 30 MINUTES PRIOR TO PROCEDURE, MAY REPEAT ONCE (Patient not taking: Reported on 5/9/2023)      mycophenolate (CELLCEPT) 500 MG tablet Take 2 Tablets by mouth 2 times a day.      nystatin (MYCOSTATIN) 754805 UNIT/GM Cream topical cream Apply 1 g topically 2 times a day. 30 g 1    mycophenolate (CELLCEPT)  "500 MG tablet TAKE 1 TABLET BY MOUTH TWICE DAILY FOR 2 WEEKS THEN TAKE 2 TABLETS TWICE DAILY      vitamin E 180 mg (400 units) Cap Take 180 mg by mouth every day.      acetaminophen (TYLENOL) 325 MG Tab Take 325 mg by mouth. (Patient not taking: Reported on 11/21/2023)       No current facility-administered medications on file prior to visit.       Allergies: Patient has no known allergies.      ROS:   Review of Systems   Constitutional:  Negative for chills, fever and malaise/fatigue.   HENT:  Negative for congestion.    Respiratory:  Positive for shortness of breath. Negative for cough, hemoptysis, sputum production and wheezing.    Cardiovascular:  Negative for chest pain, palpitations and leg swelling.   Gastrointestinal:  Negative for heartburn.   Neurological:  Negative for dizziness and headaches.   Endo/Heme/Allergies:  Positive for environmental allergies.       Vitals:  /60 (BP Location: Left arm, Patient Position: Sitting, BP Cuff Size: Adult)   Pulse 77   Ht 1.727 m (5' 8\")   Wt 78 kg (172 lb)   SpO2 92%     Physical Exam:  Physical Exam  Constitutional:       Appearance: Normal appearance.   HENT:      Head: Atraumatic.      Mouth/Throat:      Mouth: Mucous membranes are dry.   Cardiovascular:      Rate and Rhythm: Normal rate. Rhythm irregular.      Heart sounds: Murmur heard.   Pulmonary:      Effort: Pulmonary effort is normal. No respiratory distress.      Breath sounds: No wheezing or rales.   Abdominal:      General: Abdomen is flat.   Musculoskeletal:         General: No swelling.   Skin:     General: Skin is warm and dry.   Neurological:      General: No focal deficit present.      Mental Status: He is alert and oriented to person, place, and time.           Vaccinations:    Pneumovax: Complete  Covid: Vaccinated  Annual Flu: Due    Pertinent Studies:  Laboratory Data:    6MWT:  3/3/21: 263m on room air    PFTs as reviewed by me personally show:    Imaging as reviewed by me personally " show:      Pertinent Cardiac Studies:  Echo:    RHC:  4/5/21:  Right Heart Cath (4/5/2021) RA 9 PA 47/18 (31) PCW 15 CO 4.57 PVR 3.5                        POST PROCEDURAL DIAGNOSIS/CONCLUSIONS:   Pulmonary Hypertension                                             1. Borderline elevated right (RAP 9 mmHg) and left (PAWP 15 mmHg) sided filling pressures   2. Normal cardiac output(index) via both Haroldo 5.5 L/min (2.7 L/min/m2) and TDCO 4.6 L/min (2.3 L/min/m2)   3. Combined pre- and post-capillary pulmonary hypertension with mPA 31 mmHg, TPG 16 mmHg, and PVR 3.5 OQUENDO (using TDCO)   4. Brachial vein hemostasis with manual compression     Assessment/Plan:    Problem List Items Addressed This Visit       ILD (interstitial lung disease) (HCC)    Relevant Orders    PULMONARY FUNCTION TESTS -Test requested: Complete Pulmonary Function Test    Coccidioides Antibodies Panel, Serum    Referral to Pulmonary Rehab    CBC WITH DIFFERENTIAL    Comp Metabolic Panel    Pulmonary hypertension (HCC)     Patient has a history of CHP for many years 2/2 sheela exposure. His CHP was controlled well with immunosuppression with cellcept 1000mg BID and avoidance of exposures.  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  He developed signs of UIP/IPF in 2023 and was started on Pirfenidone.  He has concurrently been followed by [facility] for combined Group I and III PH, treated with Tyvaso at 15 puffs QID with good symptomatic response and Echo decreased RVSP to 40.    Plan:  - obtain HRCT images  - CBC and CMP per patient request  - cocci panel due to exposures  - PFT  - Pulmonary Rehab Referral  - Continue Tyvaso 15puffs QID  - Continue Pirfenidone 3 tabs TID (801mg TID)  - Follow up in 3 months.          Relevant Orders    PULMONARY FUNCTION TESTS -Test requested: Complete Pulmonary Function Test    Coccidioides Antibodies Panel, Serum    Referral to Pulmonary Rehab    On home oxygen therapy        Return in  about 3 months (around 5/20/2024) for With Dr. Garcia in the PULMONARY HYPERTENSION CLINIC.     This note was generated using voice recognition software which has a chance of producing errors of grammar and possibly content.  I have made every reasonable attempt to find and correct any obvious errors, but it should be expected that some may not be found prior to finalization of this note.    Time spent in record review prior to patient arrival, reviewing results, and in face-to-face encounter totaled 60 min, excluding any procedures if performed.      Jacklyn Garcia MD RD  Pulmonary and Critical Care Medicine  [facility]

## 2024-02-20 ENCOUNTER — HOSPITAL ENCOUNTER (OUTPATIENT)
Dept: RADIOLOGY | Facility: MEDICAL CENTER | Age: 84
End: 2024-02-20
Attending: INTERNAL MEDICINE

## 2024-02-20 ENCOUNTER — OFFICE VISIT (OUTPATIENT)
Dept: SLEEP MEDICINE | Facility: MEDICAL CENTER | Age: 84
End: 2024-02-20
Attending: NURSE PRACTITIONER
Payer: MEDICARE

## 2024-02-20 VITALS
DIASTOLIC BLOOD PRESSURE: 60 MMHG | HEIGHT: 68 IN | WEIGHT: 172 LBS | SYSTOLIC BLOOD PRESSURE: 122 MMHG | HEART RATE: 77 BPM | OXYGEN SATURATION: 92 % | BODY MASS INDEX: 26.07 KG/M2

## 2024-02-20 DIAGNOSIS — J84.9 ILD (INTERSTITIAL LUNG DISEASE) (HCC): ICD-10-CM

## 2024-02-20 DIAGNOSIS — Z99.81 ON HOME OXYGEN THERAPY: ICD-10-CM

## 2024-02-20 DIAGNOSIS — I27.20 PULMONARY HYPERTENSION (HCC): ICD-10-CM

## 2024-02-20 PROCEDURE — 3074F SYST BP LT 130 MM HG: CPT | Performed by: INTERNAL MEDICINE

## 2024-02-20 PROCEDURE — 99205 OFFICE O/P NEW HI 60 MIN: CPT | Performed by: INTERNAL MEDICINE

## 2024-02-20 PROCEDURE — 99213 OFFICE O/P EST LOW 20 MIN: CPT | Performed by: INTERNAL MEDICINE

## 2024-02-20 PROCEDURE — 3078F DIAST BP <80 MM HG: CPT | Performed by: INTERNAL MEDICINE

## 2024-02-20 ASSESSMENT — ENCOUNTER SYMPTOMS
PALPITATIONS: 0
FEVER: 0
SPUTUM PRODUCTION: 0
HEMOPTYSIS: 0
COUGH: 0
SHORTNESS OF BREATH: 1
HEADACHES: 0
WHEEZING: 0
DIZZINESS: 0
CHILLS: 0
HEARTBURN: 0

## 2024-02-20 ASSESSMENT — FIBROSIS 4 INDEX: FIB4 SCORE: 2.66

## 2024-02-26 NOTE — ASSESSMENT & PLAN NOTE
Patient has a history of CHP for many years 2/2 sheela exposure. His CHP was controlled well with immunosuppression with cellcept 1000mg BID and avoidance of exposures.  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  He developed signs of UIP/IPF in 2023 and was started on Pirfenidone.  He has concurrently been followed by [facility] for combined Group I and III PH, treated with Tyvaso at 15 puffs QID with good symptomatic response and Echo decreased RVSP to 40.    Plan:  - obtain HRCT images  - CBC and CMP per patient request  - cocci panel due to exposures  - PFT  - Pulmonary Rehab Referral  - Continue Tyvaso 15puffs QID  - Continue Pirfenidone 3 tabs TID (801mg TID)  - Follow up in 3 months.

## 2024-02-27 ENCOUNTER — HOSPITAL ENCOUNTER (OUTPATIENT)
Dept: LAB | Facility: MEDICAL CENTER | Age: 84
End: 2024-02-27
Attending: INTERNAL MEDICINE
Payer: MEDICARE

## 2024-02-27 DIAGNOSIS — J84.9 ILD (INTERSTITIAL LUNG DISEASE) (HCC): ICD-10-CM

## 2024-02-27 DIAGNOSIS — I27.20 PULMONARY HYPERTENSION (HCC): ICD-10-CM

## 2024-02-27 LAB
ALBUMIN SERPL BCP-MCNC: 4.5 G/DL (ref 3.2–4.9)
ALBUMIN/GLOB SERPL: 1.6 G/DL
ALP SERPL-CCNC: 54 U/L (ref 30–99)
ALT SERPL-CCNC: 16 U/L (ref 2–50)
ANION GAP SERPL CALC-SCNC: 8 MMOL/L (ref 7–16)
AST SERPL-CCNC: 28 U/L (ref 12–45)
BASOPHILS # BLD AUTO: 0.8 % (ref 0–1.8)
BASOPHILS # BLD: 0.04 K/UL (ref 0–0.12)
BILIRUB SERPL-MCNC: 0.9 MG/DL (ref 0.1–1.5)
BUN SERPL-MCNC: 23 MG/DL (ref 8–22)
CALCIUM ALBUM COR SERPL-MCNC: 9.2 MG/DL (ref 8.5–10.5)
CALCIUM SERPL-MCNC: 9.6 MG/DL (ref 8.4–10.2)
CHLORIDE SERPL-SCNC: 97 MMOL/L (ref 96–112)
CO2 SERPL-SCNC: 34 MMOL/L (ref 20–33)
CREAT SERPL-MCNC: 0.82 MG/DL (ref 0.5–1.4)
EOSINOPHIL # BLD AUTO: 0.09 K/UL (ref 0–0.51)
EOSINOPHIL NFR BLD: 1.9 % (ref 0–6.9)
ERYTHROCYTE [DISTWIDTH] IN BLOOD BY AUTOMATED COUNT: 48.8 FL (ref 35.9–50)
FASTING STATUS PATIENT QL REPORTED: NORMAL
GFR SERPLBLD CREATININE-BSD FMLA CKD-EPI: 86 ML/MIN/1.73 M 2
GLOBULIN SER CALC-MCNC: 2.8 G/DL (ref 1.9–3.5)
GLUCOSE SERPL-MCNC: 110 MG/DL (ref 65–99)
HCT VFR BLD AUTO: 43 % (ref 42–52)
HGB BLD-MCNC: 13.8 G/DL (ref 14–18)
IMM GRANULOCYTES # BLD AUTO: 0.01 K/UL (ref 0–0.11)
IMM GRANULOCYTES NFR BLD AUTO: 0.2 % (ref 0–0.9)
LYMPHOCYTES # BLD AUTO: 0.69 K/UL (ref 1–4.8)
LYMPHOCYTES NFR BLD: 14.3 % (ref 22–41)
MCH RBC QN AUTO: 31.3 PG (ref 27–33)
MCHC RBC AUTO-ENTMCNC: 32.1 G/DL (ref 32.3–36.5)
MCV RBC AUTO: 97.5 FL (ref 81.4–97.8)
MONOCYTES # BLD AUTO: 0.43 K/UL (ref 0–0.85)
MONOCYTES NFR BLD AUTO: 8.9 % (ref 0–13.4)
NEUTROPHILS # BLD AUTO: 3.56 K/UL (ref 1.82–7.42)
NEUTROPHILS NFR BLD: 73.9 % (ref 44–72)
NRBC # BLD AUTO: 0 K/UL
NRBC BLD-RTO: 0 /100 WBC (ref 0–0.2)
PLATELET # BLD AUTO: 158 K/UL (ref 164–446)
PMV BLD AUTO: 10.1 FL (ref 9–12.9)
POTASSIUM SERPL-SCNC: 3.4 MMOL/L (ref 3.6–5.5)
PROT SERPL-MCNC: 7.3 G/DL (ref 6–8.2)
RBC # BLD AUTO: 4.41 M/UL (ref 4.7–6.1)
SODIUM SERPL-SCNC: 139 MMOL/L (ref 135–145)
WBC # BLD AUTO: 4.8 K/UL (ref 4.8–10.8)

## 2024-02-27 PROCEDURE — 80053 COMPREHEN METABOLIC PANEL: CPT

## 2024-02-27 PROCEDURE — 85025 COMPLETE CBC W/AUTO DIFF WBC: CPT

## 2024-02-27 PROCEDURE — 86635 COCCIDIOIDES ANTIBODY: CPT

## 2024-02-27 PROCEDURE — 36415 COLL VENOUS BLD VENIPUNCTURE: CPT

## 2024-03-02 LAB
C IMMITIS AB SER QL ID: NOT DETECTED
COCCIDIOIDES AB TITR SER CF: NORMAL {TITER}
COCCIDIOIDES IGG SER-ACNC: 0.3 IV
COCCIDIOIDES IGM SERPL-ACNC: 0.4 IV

## 2024-04-10 ENCOUNTER — HOSPITAL ENCOUNTER (OUTPATIENT)
Dept: PULMONOLOGY | Facility: MEDICAL CENTER | Age: 84
End: 2024-04-10
Attending: INTERNAL MEDICINE
Payer: MEDICARE

## 2024-04-10 PROCEDURE — 99211 OFF/OP EST MAY X REQ PHY/QHP: CPT | Mod: 25

## 2024-04-10 PROCEDURE — G0237 THERAPEUTIC PROCD STRG ENDUR: HCPCS

## 2024-04-17 ENCOUNTER — HOSPITAL ENCOUNTER (OUTPATIENT)
Dept: PULMONOLOGY | Facility: MEDICAL CENTER | Age: 84
End: 2024-04-17
Payer: MEDICARE

## 2024-04-17 PROCEDURE — G0237 THERAPEUTIC PROCD STRG ENDUR: HCPCS

## 2024-04-17 PROCEDURE — G0239 OTH RESP PROC, GROUP: HCPCS

## 2024-04-17 PROCEDURE — G0238 OTH RESP PROC, INDIV: HCPCS

## 2024-04-22 ENCOUNTER — HOSPITAL ENCOUNTER (OUTPATIENT)
Dept: PULMONOLOGY | Facility: MEDICAL CENTER | Age: 84
End: 2024-04-22
Attending: INTERNAL MEDICINE
Payer: MEDICARE

## 2024-04-22 PROCEDURE — G0237 THERAPEUTIC PROCD STRG ENDUR: HCPCS

## 2024-04-22 PROCEDURE — G0239 OTH RESP PROC, GROUP: HCPCS

## 2024-04-22 PROCEDURE — G0238 OTH RESP PROC, INDIV: HCPCS

## 2024-04-24 ENCOUNTER — HOSPITAL ENCOUNTER (OUTPATIENT)
Dept: PULMONOLOGY | Facility: MEDICAL CENTER | Age: 84
End: 2024-04-24
Attending: INTERNAL MEDICINE
Payer: MEDICARE

## 2024-04-24 PROCEDURE — G0237 THERAPEUTIC PROCD STRG ENDUR: HCPCS

## 2024-04-24 PROCEDURE — G0238 OTH RESP PROC, INDIV: HCPCS

## 2024-04-24 PROCEDURE — G0239 OTH RESP PROC, GROUP: HCPCS

## 2024-04-26 ENCOUNTER — HOSPITAL ENCOUNTER (OUTPATIENT)
Dept: LAB | Facility: MEDICAL CENTER | Age: 84
End: 2024-04-26
Attending: INTERNAL MEDICINE
Payer: MEDICARE

## 2024-04-26 LAB
ALBUMIN SERPL BCP-MCNC: 4.3 G/DL (ref 3.2–4.9)
ALBUMIN/GLOB SERPL: 1.5 G/DL
ALP SERPL-CCNC: 52 U/L (ref 30–99)
ALT SERPL-CCNC: 15 U/L (ref 2–50)
ANION GAP SERPL CALC-SCNC: 10 MMOL/L (ref 7–16)
AST SERPL-CCNC: 29 U/L (ref 12–45)
BASOPHILS # BLD AUTO: 0.6 % (ref 0–1.8)
BASOPHILS # BLD: 0.03 K/UL (ref 0–0.12)
BILIRUB SERPL-MCNC: 1 MG/DL (ref 0.1–1.5)
BUN SERPL-MCNC: 21 MG/DL (ref 8–22)
CALCIUM ALBUM COR SERPL-MCNC: 8.9 MG/DL (ref 8.5–10.5)
CALCIUM SERPL-MCNC: 9.1 MG/DL (ref 8.4–10.2)
CHLORIDE SERPL-SCNC: 95 MMOL/L (ref 96–112)
CO2 SERPL-SCNC: 31 MMOL/L (ref 20–33)
CREAT SERPL-MCNC: 0.81 MG/DL (ref 0.5–1.4)
EOSINOPHIL # BLD AUTO: 0.08 K/UL (ref 0–0.51)
EOSINOPHIL NFR BLD: 1.6 % (ref 0–6.9)
ERYTHROCYTE [DISTWIDTH] IN BLOOD BY AUTOMATED COUNT: 49.1 FL (ref 35.9–50)
FASTING STATUS PATIENT QL REPORTED: NORMAL
GFR SERPLBLD CREATININE-BSD FMLA CKD-EPI: 87 ML/MIN/1.73 M 2
GLOBULIN SER CALC-MCNC: 2.9 G/DL (ref 1.9–3.5)
GLUCOSE SERPL-MCNC: 102 MG/DL (ref 65–99)
HCT VFR BLD AUTO: 41.4 % (ref 42–52)
HGB BLD-MCNC: 13.2 G/DL (ref 14–18)
IMM GRANULOCYTES # BLD AUTO: 0.02 K/UL (ref 0–0.11)
IMM GRANULOCYTES NFR BLD AUTO: 0.4 % (ref 0–0.9)
LYMPHOCYTES # BLD AUTO: 0.64 K/UL (ref 1–4.8)
LYMPHOCYTES NFR BLD: 12.5 % (ref 22–41)
MCH RBC QN AUTO: 30.5 PG (ref 27–33)
MCHC RBC AUTO-ENTMCNC: 31.9 G/DL (ref 32.3–36.5)
MCV RBC AUTO: 95.6 FL (ref 81.4–97.8)
MONOCYTES # BLD AUTO: 0.4 K/UL (ref 0–0.85)
MONOCYTES NFR BLD AUTO: 7.8 % (ref 0–13.4)
NEUTROPHILS # BLD AUTO: 3.96 K/UL (ref 1.82–7.42)
NEUTROPHILS NFR BLD: 77.1 % (ref 44–72)
NRBC # BLD AUTO: 0 K/UL
NRBC BLD-RTO: 0 /100 WBC (ref 0–0.2)
PLATELET # BLD AUTO: 175 K/UL (ref 164–446)
PMV BLD AUTO: 9.7 FL (ref 9–12.9)
POTASSIUM SERPL-SCNC: 3.6 MMOL/L (ref 3.6–5.5)
PROT SERPL-MCNC: 7.2 G/DL (ref 6–8.2)
RBC # BLD AUTO: 4.33 M/UL (ref 4.7–6.1)
SODIUM SERPL-SCNC: 136 MMOL/L (ref 135–145)
WBC # BLD AUTO: 5.1 K/UL (ref 4.8–10.8)

## 2024-04-26 PROCEDURE — 80053 COMPREHEN METABOLIC PANEL: CPT

## 2024-04-26 PROCEDURE — 85025 COMPLETE CBC W/AUTO DIFF WBC: CPT

## 2024-04-26 PROCEDURE — 36415 COLL VENOUS BLD VENIPUNCTURE: CPT

## 2024-04-26 PROCEDURE — 86635 COCCIDIOIDES ANTIBODY: CPT

## 2024-04-29 ENCOUNTER — HOSPITAL ENCOUNTER (OUTPATIENT)
Dept: PULMONOLOGY | Facility: MEDICAL CENTER | Age: 84
End: 2024-04-29
Attending: INTERNAL MEDICINE
Payer: MEDICARE

## 2024-04-29 PROCEDURE — G0237 THERAPEUTIC PROCD STRG ENDUR: HCPCS

## 2024-04-29 PROCEDURE — G0239 OTH RESP PROC, GROUP: HCPCS

## 2024-04-29 PROCEDURE — G0238 OTH RESP PROC, INDIV: HCPCS

## 2024-05-01 ENCOUNTER — HOSPITAL ENCOUNTER (OUTPATIENT)
Dept: PULMONOLOGY | Facility: MEDICAL CENTER | Age: 84
End: 2024-05-01
Attending: INTERNAL MEDICINE
Payer: MEDICARE

## 2024-05-06 ENCOUNTER — HOSPITAL ENCOUNTER (OUTPATIENT)
Dept: PULMONOLOGY | Facility: MEDICAL CENTER | Age: 84
End: 2024-05-06
Attending: INTERNAL MEDICINE
Payer: MEDICARE

## 2024-05-08 ENCOUNTER — HOSPITAL ENCOUNTER (OUTPATIENT)
Dept: PULMONOLOGY | Facility: MEDICAL CENTER | Age: 84
End: 2024-05-08
Attending: INTERNAL MEDICINE
Payer: MEDICARE

## 2024-05-13 ENCOUNTER — HOSPITAL ENCOUNTER (OUTPATIENT)
Dept: PULMONOLOGY | Facility: MEDICAL CENTER | Age: 84
End: 2024-05-13
Attending: INTERNAL MEDICINE
Payer: MEDICARE

## 2024-05-13 ASSESSMENT — ENCOUNTER SYMPTOMS
FEVER: 0
HEARTBURN: 0
HEADACHES: 0
WHEEZING: 0
SHORTNESS OF BREATH: 1
SPUTUM PRODUCTION: 0
DIZZINESS: 0
PALPITATIONS: 0
COUGH: 0
CHILLS: 0
HEMOPTYSIS: 0

## 2024-05-13 NOTE — PROGRESS NOTES
Pulmonary Hypertension Clinic- Initial Consult    Date of Service: 2/20/24    Referring Physician: Mary Diggs A.P.*    Reason for Consult: ILD/Pulmonary Hypertension    Chief Complaint:   Chief Complaint   Patient presents with    Follow-Up     Last seen 02/20/2024       HPI:   Pablo Kraus is a 83 y.o. male who is followed by Dr. Anderson in [place] and is referred to the pulmonary clinic for ILD/Pulmonary hypertension.  He has been assessed and managed by ILD/PH specialists at [facility] Drew Kimble and Yury Mcintosh. He is on Pirfenidone and Tyvaso (16 puffs QID) and states he tolerates these medications well.     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  He then avoided contact with birds and moved to [facility] where he reports he was asymptomatic for many years.  He does endorse about 5 times throughout his adult life where he had very high fevers (once in [place]) and was admitted to hospital for a short time but never told what his diagnosis was.  He states he was well (hiking and climbing) until about 6 years ago (2018) when he took a trip to [place] and had a flare of his lung disease which set him on a downward trajectory that he continues on now.  He is living in [facility] and would like to move his care from [place] and [facility] to [facility] for ease of getting to and from appointments.     He has had a diagnosis from Dr. Anderson, confirmed by Dr. 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  He is using 3L of oxygen today.      Per our Care Everywhere records it appears he started to see DR. 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  He was started on Cellcept at that time.  In 12/2020 he was noted to be fluid overloaded and concern for PH triggered a referral to Dr. Mcintosh. AT that time he was on  cellcept 1000mg BID.    He saw DR. Mcintosh in March 2021 and was diagnosed with PH via RHC showing precapillary PH.  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     6/21 - 2-3LPM supplemental O2, still on cellcept, on 8 breaths QID of Tyvaso.      2/22 - Titrated up to 15 puffs QID with significant symptomatic improvement.  RVSP down to 40.     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 3-4 LPM supplemental O2.     12/23 Last saw Dr. 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     5/17/24: Pablo is doing well.  Symptoms are stable off Cellcept thus far.  PFTs demonstrate a severe restriction.     Functional status:  NYHA Class:   I: no symptoms with activity. Normal  II. Mild symptoms with normal physical activity and comfortable at rest.  III: Moderate symptoms with less than normal physical activity. Only comfortable at rest  IV: Severe symptoms with symptoms of heart failure, even at rest.    Pulmonary Hypertension Workup:  Echo: 2021: Normal EF, RAP 8, RVSP 50mmHg  RHC: (4/5/2021) RA 9 PA 47/18 (31) PCW 15 CO 4.57 PVR 3.5            V/Q Scan/CTPA:  HRCT: None available, CT scans in PACS are empty files  CTD Panel: negative at [facility]    ILD (interstitial lung disease) (HCC)      Relevant Orders     PULMONARY FUNCTION TESTS -Test requested: Complete Pulmonary Function Test     Coccidioides Antibodies Panel, Serum     Referral to Pulmonary Rehab     CBC WITH DIFFERENTIAL     Comp Metabolic Panel     Pulmonary hypertension (HCC)       Patient has a history of CHP for many years 2/2 hseela exposure. His CHP was controlled well with immunosuppression with cellcept 1000mg BID and avoidance of exposures.  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  He developed signs of  "UIP/IPF in 2023 and was started on Pirfenidone.  He has concurrently been followed by [facility] for combined Group I and III PH, treated with Tyvaso at 15 puffs QID with good symptomatic response and Echo decreased RVSP to 40.    Plan:  - obtain HRCT images  - CBC and CMP per patient request  - cocci panel due to exposures  - PFT  - Pulmonary Rehab Referral  - Continue Tyvaso 15puffs QID  - Continue Pirfenidone 3 tabs TID (801mg TID)  - Follow up in 3 months.            Relevant Orders     PULMONARY FUNCTION TESTS -Test requested: Complete Pulmonary Function Test     Coccidioides Antibodies Panel, Serum     Referral to Pulmonary Rehab       Past Medical History:   Diagnosis Date    Arrhythmia     6-7 years ago    Blood clotting disorder (HCC)     Eliquis    Breath shortness     \"gradually over 20 years\"    Cancer (HCC) 2000    Prostate    Hiatus hernia syndrome     6-7 years ago    High cholesterol     15 years ago    Hypertension     Cozaar    Malignant neoplasm of prostate (HCC) 08/18/2012    Other dyspnea and respiratory abnormality 12/16/2010    Oxygen dependent 04/20/2021    3 Liters    Pneumonia     60 years ago    Pneumonitis, hypersensitivity, sheela (HCC) 09/28/2016    Shortness of breath        Past Surgical History:   Procedure Laterality Date    MD COLONOSCOPY,DIAGNOSTIC N/A 4/27/2021    Procedure: COLONOSCOPY;  Surgeon: Lalit Negron M.D.;  Location: SURGERY SAME DAY [facility];  Service: Gastroenterology    OTHER CARDIAC SURGERY  2016    Ablation    APPENDECTOMY      PROSTATECTOMY, RADICAL RETRO      TONSILLECTOMY         Social History     Socioeconomic History    Marital status:      Spouse name: Not on file    Number of children: Not on file    Years of education: Not on file    Highest education level: Associate degree: academic program   Occupational History    Not on file   Tobacco Use    Smoking status: Former     Current packs/day: 0.00     Average packs/day: 1 pack/day for 35.0 years (35.0 " "ttl pk-yrs)     Types: Cigarettes, Cigars     Start date: 1950     Quit date: 1985     Years since quittin.3    Smokeless tobacco: Never    Tobacco comments:     Quit smoking twice during 34 years; final was    Vaping Use    Vaping status: Never Used   Substance and Sexual Activity    Alcohol use: Not Currently     Comment: \"One or two measured 1 oz drinks with my wife before dinner.\"    Drug use: Never    Sexual activity: Not on file   Other Topics Concern    Not on file   Social History Narrative    Not on file     Social Determinants of Health     Financial Resource Strain: Low Risk  (3/27/2023)    Received from [facility]    Overall Financial Resource Strain (CARDIA)     Difficulty of Paying Living Expenses: Not hard at all   Food Insecurity: No Food Insecurity (3/27/2023)    Received from [facility]    Hunger Vital Sign     Worried About Running Out of Food in the Last Year: Never true     Ran Out of Food in the Last Year: Never true   Transportation Needs: No Transportation Needs (3/27/2023)    Received from [facility]    PRAPARE - Transportation     Lack of Transportation (Medical): No     Lack of Transportation (Non-Medical): No   Physical Activity: Patient Declined (3/27/2023)    Received from [facility]    Exercise Vital Sign     Days of Exercise per Week: Patient declined     Minutes of Exercise per Session: Patient declined   Stress: Patient Declined (3/27/2023)    Received from [facility]    [place] [employer] - Occupational Stress Questionnaire     Feeling of Stress : Patient declined   Social Connections: Patient Declined (3/27/2023)    Received from [facility]    Social Connection and Isolation Panel [NHANES]     Frequency of Communication with Friends and Family: Patient declined     Frequency of Social Gatherings with Friends and Family: Patient declined     Attends " Adventism Services: Patient declined     Active Member of Clubs or Organizations: Patient declined     Attends Club or Organization Meetings: Patient declined     Marital Status: Patient declined   Intimate Partner Violence: Patient Declined (3/27/2023)    Received from [facility]    Humiliation, Afraid, Rape, and Kick questionnaire     Fear of Current or Ex-Partner: Patient declined     Emotionally Abused: Patient declined     Physically Abused: Patient declined     Sexually Abused: Patient declined   Housing Stability: Unknown (3/27/2023)    Received from [facility]    Housing Stability Vital Sign     Unable to Pay for Housing in the Last Year: No     Number of Places Lived in the Last Year: Not on file     Unstable Housing in the Last Year: Not on file          No family history on file.    Current Outpatient Medications on File Prior to Visit   Medication Sig Dispense Refill    potassium chloride ER (KLOR-CON) 10 MEQ tablet Take 10 mEq by mouth every day.      vitamin e (VITAMIN E) 400 UNIT Cap Take 180 mg by mouth every day.      Pirfenidone 267 MG Tab       simvastatin (ZOCOR) 40 MG Tab Take 40 mg by mouth.      furosemide (LASIX) 20 MG Tab Take 1 tablet by mouth once daily      Treprostinil (TYVASO) 0.6 MG/ML Solution Inhale.      Home Care Oxygen Inhale continuous. 3 LNC 24 hours day      hydroCHLOROthiazide (HYDRODIURIL) 25 MG Tab Take 25 mg by mouth every day.      apixaban (ELIQUIS) 5mg Tab Take 5 mg by mouth.      losartan (COZAAR) 100 MG Tab Take 100 mg by mouth every day.      acetaminophen (TYLENOL) 325 MG Tab Take 325 mg by mouth.       No current facility-administered medications on file prior to visit.       Allergies: Patient has no known allergies.      ROS:   Review of Systems   Constitutional:  Negative for chills, fever and malaise/fatigue.   HENT:  Negative for congestion.    Respiratory:  Positive for shortness of breath. Negative for cough, hemoptysis, sputum  "production and wheezing.    Cardiovascular:  Negative for chest pain, palpitations and leg swelling.   Gastrointestinal:  Negative for heartburn.   Neurological:  Negative for dizziness and headaches.   Endo/Heme/Allergies:  Positive for environmental allergies.       Vitals:  BP 98/54 (BP Location: Right arm, Patient Position: Sitting, BP Cuff Size: Adult)   Pulse 70   Resp 18   Ht 1.727 m (5' 8\")   Wt 78 kg (172 lb)   SpO2 94%     Physical Exam:  Physical Exam  Constitutional:       Appearance: Normal appearance.   HENT:      Head: Atraumatic.      Mouth/Throat:      Mouth: Mucous membranes are dry.   Cardiovascular:      Rate and Rhythm: Normal rate. Rhythm irregular.      Heart sounds: Murmur heard.   Pulmonary:      Effort: Pulmonary effort is normal. No respiratory distress.      Breath sounds: No wheezing or rales.   Abdominal:      General: Abdomen is flat.   Musculoskeletal:         General: No swelling.   Skin:     General: Skin is warm and dry.   Neurological:      General: No focal deficit present.      Mental Status: He is alert and oriented to person, place, and time.           Vaccinations:    Pneumovax: Complete  Covid: Vaccinated  Annual Flu: Due    Pertinent Studies:  Laboratory Data:  Cocci serologies negative 4/26/24      6MWT:  3/3/21: 263m on room air    PFTs as reviewed by me personally show:      PFT Trend:  FVC(%):  FEV1 (%): TLC(%):  DLCO(%)  Date:  5/17/24      1.64L(50%), 1.39L(59%), 3.31L(49%), 8.28(37%)  8/30/22      3.5L(53%), 2.45L(59%), 2.72L(42%), 10.05(38%)    Imaging as reviewed by me personally show:    HRCT: 11/28/23:      Pertinent Cardiac Studies:  Echo:    RHC:  4/5/21:  Right Heart Cath (4/5/2021) RA 9 PA 47/18 (31) PCW 15 CO 4.57 PVR 3.5                        POST PROCEDURAL DIAGNOSIS/CONCLUSIONS:   Pulmonary Hypertension                                             1. Borderline elevated right (RAP 9 mmHg) and left (PAWP 15 mmHg) sided filling pressures   2. Normal " cardiac output(index) via both Haroldo 5.5 L/min (2.7 L/min/m2) and TDCO 4.6 L/min (2.3 L/min/m2)   3. Combined pre- and post-capillary pulmonary hypertension with mPA 31 mmHg, TPG 16 mmHg, and PVR 3.5 OQUENDO (using TDCO)   4. Brachial vein hemostasis with manual compression     Assessment/Plan:    Problem List Items Addressed This Visit       ILD (interstitial lung disease) (HCC)    Relevant Orders    Comp Metabolic Panel    Pulmonary hypertension (HCC)     Patient has a history of CHP for many years 2/2 sheela exposure. His CHP was controlled well with immunosuppression with cellcept 1000mg BID and avoidance of exposures.  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  He developed signs of UIP/IPF in 2023 and was started on Pirfenidone.  He has concurrently been followed by [facility] for combined Group I and III PH, treated with Tyvaso at 15 puffs QID with good symptomatic response and Echo decreased RVSP to 40.    Plan:  - 6 MWT 3 months  - PFT 3 months  - Pulmonary Rehab Referral - complete program  - Continue Tyvaso 15puffs QID - Rx sent [facility]  - Continue Pirfenidone 3 tabs TID (801mg TID) - Renewed  - CMP every 3 months to assess creatinine and liver function  - BNP q 3 months  - Follow up in 3 months.          Relevant Medications    Pirfenidone 267 MG Tab    Treprostinil (TYVASO) 0.6 MG/ML Solution    Other Relevant Orders    REFERRAL TO CARDIOLOGY    Exercise Test for Bronchospasm / 6-Minute Walk    PULMONARY FUNCTION TESTS -Test requested: Complete Pulmonary Function Test    Comp Metabolic Panel    proBrain Natriuretic Peptide, NT          Return in about 3 months (around 8/17/2024) for Dr. Garcia PULMONARY HYPERTENSION CLINIC.     This note was generated using voice recognition software which has a chance of producing errors of grammar and possibly content.  I have made every reasonable attempt to find and correct any obvious errors, but it should be expected that some  may not be found prior to finalization of this note.    Time spent in record review prior to patient arrival, reviewing results, and in face-to-face encounter totaled 45 min, excluding any procedures if performed.      Jacklyn Garcia MD RD  Pulmonary and Critical Care Medicine  Critical [facility] hospital

## 2024-05-15 ENCOUNTER — HOSPITAL ENCOUNTER (OUTPATIENT)
Dept: PULMONOLOGY | Facility: MEDICAL CENTER | Age: 84
End: 2024-05-15
Attending: INTERNAL MEDICINE
Payer: MEDICARE

## 2024-05-17 ENCOUNTER — OFFICE VISIT (OUTPATIENT)
Dept: SLEEP MEDICINE | Facility: MEDICAL CENTER | Age: 84
End: 2024-05-17
Attending: INTERNAL MEDICINE
Payer: MEDICARE

## 2024-05-17 VITALS — BODY MASS INDEX: 25.72 KG/M2 | HEIGHT: 68 IN | WEIGHT: 169.7 LBS

## 2024-05-17 VITALS
HEART RATE: 70 BPM | RESPIRATION RATE: 18 BRPM | SYSTOLIC BLOOD PRESSURE: 98 MMHG | HEIGHT: 68 IN | BODY MASS INDEX: 26.07 KG/M2 | WEIGHT: 172 LBS | OXYGEN SATURATION: 94 % | DIASTOLIC BLOOD PRESSURE: 54 MMHG

## 2024-05-17 DIAGNOSIS — J84.9 ILD (INTERSTITIAL LUNG DISEASE) (HCC): ICD-10-CM

## 2024-05-17 DIAGNOSIS — I27.20 PULMONARY HYPERTENSION (HCC): ICD-10-CM

## 2024-05-17 PROCEDURE — 94726 PLETHYSMOGRAPHY LUNG VOLUMES: CPT | Performed by: INTERNAL MEDICINE

## 2024-05-17 PROCEDURE — 94726 PLETHYSMOGRAPHY LUNG VOLUMES: CPT | Mod: 26 | Performed by: INTERNAL MEDICINE

## 2024-05-17 PROCEDURE — 3078F DIAST BP <80 MM HG: CPT | Performed by: INTERNAL MEDICINE

## 2024-05-17 PROCEDURE — 94060 EVALUATION OF WHEEZING: CPT | Performed by: INTERNAL MEDICINE

## 2024-05-17 PROCEDURE — 94060 EVALUATION OF WHEEZING: CPT | Mod: 26 | Performed by: INTERNAL MEDICINE

## 2024-05-17 PROCEDURE — 99215 OFFICE O/P EST HI 40 MIN: CPT | Performed by: INTERNAL MEDICINE

## 2024-05-17 PROCEDURE — 94729 DIFFUSING CAPACITY: CPT | Mod: 26 | Performed by: INTERNAL MEDICINE

## 2024-05-17 PROCEDURE — 3074F SYST BP LT 130 MM HG: CPT | Performed by: INTERNAL MEDICINE

## 2024-05-17 PROCEDURE — 94729 DIFFUSING CAPACITY: CPT | Performed by: INTERNAL MEDICINE

## 2024-05-17 RX ORDER — TREPROSTINIL 1.74 MG/2.9ML
INHALANT ORAL
Qty: 2.9 ML | Refills: 12 | Status: SHIPPED | OUTPATIENT
Start: 2024-05-17

## 2024-05-17 RX ORDER — PIRFENIDONE 267 MG/1
3 TABLET, FILM COATED ORAL 3 TIMES DAILY
Qty: 90 TABLET | Refills: 6 | Status: SHIPPED | OUTPATIENT
Start: 2024-05-17

## 2024-05-17 ASSESSMENT — PULMONARY FUNCTION TESTS
FEV1: 1.39
FVC_PERCENT_PREDICTED: 47
FEV1/FVC_PERCENT_CHANGE: 1
FEV1: 1.51
FEV1/FVC_PREDICTED: 75
FEV1/FVC_PERCENT_PREDICTED: 118
FVC_PERCENT_PREDICTED: 50
FEV1_LLN: 2.13
FEV1/FVC_PERCENT_PREDICTED: 115
FEV1/FVC: 85
FEV1_PERCENT_CHANGE: 4
FEV1/FVC_PERCENT_PREDICTED: 113
FEV1_PREDICTED: 2.55
FEV1/FVC_PERCENT_PREDICTED: 74
FVC_LLN: 2.89
FEV1/FVC: 85
FEV1/FVC_PERCENT_CHANGE: 133
FEV1_PERCENT_CHANGE: 3
FEV1/FVC: 86
FEV1/FVC_PERCENT_LLN: 62
FVC: 1.75
FEV1/FVC_PERCENT_PREDICTED: 116
FVC: 1.64
FVC_PREDICTED: 3.46
FEV1_PERCENT_PREDICTED: 59
FEV1/FVC: 86.29
FEV1_PERCENT_PREDICTED: 54

## 2024-05-17 ASSESSMENT — FIBROSIS 4 INDEX
FIB4 SCORE: 3.59
FIB4 SCORE: 3.59

## 2024-05-17 NOTE — PROCEDURES
Tech: Lise Schreiber, FREDI  Good patient effort & cooperation.  Test was performed on the [employer] Body Plethysmograph- Elite DX system.  The predicted sets used for Spirometry are GLI-2012, for Lung Volumes are ITS, and for DLCO is GLI 2017.  The results of this test meet the ATS standards for acceptability and repeatability.  The DLCO was uncorrected for Hb.  A bronchodilator of Ventolin HFA 2 puffs via spacer was administered.  DLCO was performed during dilation period.    Interpretation:

## 2024-05-17 NOTE — ASSESSMENT & PLAN NOTE
Patient has a history of CHP for many years 2/2 sheela exposure. His CHP was controlled well with immunosuppression with cellcept 1000mg BID and avoidance of exposures.  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  He developed signs of UIP/IPF in 2023 and was started on Pirfenidone.  He has concurrently been followed by [facility] for combined Group I and III PH, treated with Tyvaso at 15 puffs QID with good symptomatic response and Echo decreased RVSP to 40.    Plan:  - 6 MWT 3 months  - PFT 3 months  - Pulmonary Rehab Referral - complete program  - Continue Tyvaso 15puffs QID - [facility]  - Continue Pirfenidone 3 tabs TID (801mg TID) - Renewed  - CMP every 3 months to assess creatinine and liver function  - BNP q 3 months  - Follow up in 3 months.

## 2024-05-20 ENCOUNTER — TELEPHONE (OUTPATIENT)
Dept: SLEEP MEDICINE | Facility: MEDICAL CENTER | Age: 84
End: 2024-05-20

## 2024-05-20 ENCOUNTER — HOSPITAL ENCOUNTER (OUTPATIENT)
Dept: PULMONOLOGY | Facility: MEDICAL CENTER | Age: 84
End: 2024-05-20
Attending: INTERNAL MEDICINE
Payer: MEDICARE

## 2024-05-20 NOTE — TELEPHONE ENCOUNTER
VOICEMAIL: 05/17/24  1. Caller Name: Pablo                      Call Back Number: [phone] (home)      2. Message: Pt called and lm, wanting a call back.     3. Patient approves office to leave a detailed voicemail/MyChart message: N\A    05/20/24:   Called and spoke with pt. Pt said someone called him and told it was pulmonary. Notified pt I don't see that we called him. Possibly Cardiology (we referred him to) or Pulmonary rehab ( we referred pt to). Provided pt with cardiology's dept phone number.

## 2024-05-22 ENCOUNTER — TELEPHONE (OUTPATIENT)
Dept: SLEEP MEDICINE | Facility: MEDICAL CENTER | Age: 84
End: 2024-05-22

## 2024-05-22 ENCOUNTER — HOSPITAL ENCOUNTER (OUTPATIENT)
Dept: PULMONOLOGY | Facility: MEDICAL CENTER | Age: 84
End: 2024-05-22
Attending: INTERNAL MEDICINE
Payer: MEDICARE

## 2024-05-22 DIAGNOSIS — I27.20 PULMONARY HYPERTENSION (HCC): ICD-10-CM

## 2024-05-22 RX ORDER — PIRFENIDONE 267 MG/1
3 TABLET, FILM COATED ORAL 3 TIMES DAILY
Qty: 270 TABLET | Refills: 6 | Status: CANCELLED | OUTPATIENT
Start: 2024-05-22

## 2024-05-22 NOTE — TELEPHONE ENCOUNTER
Spoke with Pablo in regards to his Esbriet and where he typically fills it. [facility] and Tyvaso. He said his previous provider set him up for PAP from the manufacture and it is being sent to him every month. He has [employer] insurance for drug coverage. I will follow up with him if he has any questions.

## 2024-05-22 NOTE — TELEPHONE ENCOUNTER
VOICEMAIL: 05/22/24  1. Caller Name: Augustus, pharmacy techician with [facility]                       Call Back Number: [phone]    2. Message: Augustus with [facility] pharmacy called and lm, needing to clarify Pirfenidone is a continuation of medication and if pt already did his loading dose?  Also need clarification on the Quantity of the rx they received.     3. Patient approves office to leave a detailed voicemail/MyChart message: N\A    Rx Pended, please review.

## 2024-05-23 ENCOUNTER — TELEPHONE (OUTPATIENT)
Dept: SLEEP MEDICINE | Facility: MEDICAL CENTER | Age: 84
End: 2024-05-23
Payer: MEDICARE

## 2024-05-23 NOTE — TELEPHONE ENCOUNTER
VOICEMAIL: 5/21/24  1. Caller Name: Pablo                      Call Back Number: [phone] (home)      2. Message: Pt called and lm, wanting a call back. Pt has a question/clarify on a referral.     3. Patient approves office to leave a detailed voicemail/MyChart message: N\A    05/23/24:  Called pt and lm, I was returning his call and asked for a return call.

## 2024-05-29 ENCOUNTER — APPOINTMENT (OUTPATIENT)
Dept: PULMONOLOGY | Facility: MEDICAL CENTER | Age: 84
End: 2024-05-29
Attending: INTERNAL MEDICINE
Payer: MEDICARE

## 2024-05-29 ENCOUNTER — TELEPHONE (OUTPATIENT)
Dept: SLEEP MEDICINE | Facility: MEDICAL CENTER | Age: 84
End: 2024-05-29
Payer: MEDICARE

## 2024-05-29 NOTE — TELEPHONE ENCOUNTER
VOICEMAIL  1. Caller Name: Pablo                      Call Back Number: [phone] (home)      2. Message: Pt called and lm, re: referral.     3. Patient approves office to leave a detailed voicemail/MyChart message: N\A      05/29/24:  Called and spoke with pt. Pt said Dr Garcia referred him to a cardiologist. She mentioned a name that works well for the Right arm catheter.  Pt can't recall the name.

## 2024-05-31 NOTE — TELEPHONE ENCOUNTER
Called and spoke with stefano at [facility] specialty pharmacy. Someone call them earlier today and cleared this up with their pharmacist.

## 2024-05-31 NOTE — TELEPHONE ENCOUNTER
Jacklyn Gacria M.D.  You2 days ago     HL  Yes.     You  Jacklyn Garcia M.D.9 days ago       Is this a continuation?

## 2024-06-03 ENCOUNTER — APPOINTMENT (OUTPATIENT)
Dept: PULMONOLOGY | Facility: MEDICAL CENTER | Age: 84
End: 2024-06-03
Attending: INTERNAL MEDICINE
Payer: MEDICARE

## 2024-06-03 PROCEDURE — G0237 THERAPEUTIC PROCD STRG ENDUR: HCPCS

## 2024-06-03 PROCEDURE — G0238 OTH RESP PROC, INDIV: HCPCS

## 2024-06-03 PROCEDURE — G0239 OTH RESP PROC, GROUP: HCPCS

## 2024-06-04 ENCOUNTER — TELEPHONE (OUTPATIENT)
Dept: SLEEP MEDICINE | Facility: MEDICAL CENTER | Age: 84
End: 2024-06-04
Payer: MEDICARE

## 2024-06-04 DIAGNOSIS — I27.20 PULMONARY HYPERTENSION (HCC): ICD-10-CM

## 2024-06-04 NOTE — TELEPHONE ENCOUNTER
Incoming Fax: CovermyMeds    Re: Pirfenidone 267mg    Message: Key: [id number]                   Pt last name: Efra                   : 1940    Outcome:  Forwarding to Pharmacy coordinator.

## 2024-06-05 ENCOUNTER — APPOINTMENT (OUTPATIENT)
Dept: PULMONOLOGY | Facility: MEDICAL CENTER | Age: 84
End: 2024-06-05
Attending: INTERNAL MEDICINE
Payer: MEDICARE

## 2024-06-07 NOTE — TELEPHONE ENCOUNTER
Submitted PA via CoverMyMeds: PA Case ID #: [id number]     Pending Approval.      Dr Garcia-Please send a new rx.

## 2024-06-08 RX ORDER — PIRFENIDONE 267 MG/1
3 TABLET, FILM COATED ORAL 3 TIMES DAILY
Qty: 270 TABLET | Refills: 3 | Status: SHIPPED | OUTPATIENT
Start: 2024-06-08

## 2024-06-10 ENCOUNTER — TELEPHONE (OUTPATIENT)
Dept: PHARMACY | Facility: MEDICAL CENTER | Age: 84
End: 2024-06-10

## 2024-06-10 ENCOUNTER — HOSPITAL ENCOUNTER (OUTPATIENT)
Dept: PULMONOLOGY | Facility: MEDICAL CENTER | Age: 84
End: 2024-06-10
Attending: INTERNAL MEDICINE
Payer: MEDICARE

## 2024-06-10 PROCEDURE — G0239 OTH RESP PROC, GROUP: HCPCS

## 2024-06-10 PROCEDURE — G0238 OTH RESP PROC, INDIV: HCPCS

## 2024-06-10 PROCEDURE — G0237 THERAPEUTIC PROCD STRG ENDUR: HCPCS

## 2024-06-10 NOTE — TELEPHONE ENCOUNTER
Received Refill request via [facility]  for Pirfenidone 267 MG Tab . (Quantity:270, Day Supply:30)     Insurance: [employer]  Member ID:  [id number]    Patient is ineligible for services with [facility] due to [employer] insurance.     Will release prescription to preferred pharmacy for processing: [facility]    Viktoriya Zabala CPhT  Pharmacy Liaison  [phone]

## 2024-06-10 NOTE — TELEPHONE ENCOUNTER
FINAL PRIOR AUTHORIZATION STATUS:    1.  Name of Medication & Dose: Pirfenidone 267mg     2. Prior Auth Status: DENIED:We cover this drug when our criteria are met. The unmet criteria are: has not had other reasons for your lung tissue to harden (clinically significant environmental exposure or explanation for pulmonary fibrosis or interstitial lung disease for example: drugs, asbestos, beryllium, radiation, domestic birds, sarcoidosis, hypersensitivity pneumonitis, bronchiolitis, obliterans organizing pneumonia, human immunodeficiency virus (HIV), viral hepatitis, or cancer). This decision was from [facility] Esb[facility] (pirfenidone) Pharmacy Coverage policy     3. Action Taken: Pharmacy Notified: N\A Patient Notified: N\A    Would you like to appeal?

## 2024-06-10 NOTE — TELEPHONE ENCOUNTER
Jacklyn Garcia M.D.  You2 days ago     HL  Done.    Jacklyn Garcia MD RD  Pulmonary and Critical Care    Available on Voalte

## 2024-06-12 ENCOUNTER — HOSPITAL ENCOUNTER (OUTPATIENT)
Dept: PULMONOLOGY | Facility: MEDICAL CENTER | Age: 84
End: 2024-06-12
Attending: INTERNAL MEDICINE
Payer: MEDICARE

## 2024-06-12 PROCEDURE — G0239 OTH RESP PROC, GROUP: HCPCS

## 2024-06-12 PROCEDURE — G0238 OTH RESP PROC, INDIV: HCPCS

## 2024-06-12 PROCEDURE — G0237 THERAPEUTIC PROCD STRG ENDUR: HCPCS

## 2024-06-12 NOTE — TELEPHONE ENCOUNTER
Received the Appeal paperwork. Printed out clinicals    Appeal form signed and faxed with clinicals

## 2024-06-12 NOTE — TELEPHONE ENCOUNTER
Jacklyn Garcia M.D.  You17 hours ago (2:52 PM)     HL  Yes!  Did I submit this under pulmonary hypertension or ILD? That may be the issue.    Jacklyn Garcia MD RD  Pulmonary and Critical Care    Available on Voalte

## 2024-06-12 NOTE — TELEPHONE ENCOUNTER
Tried doing an appeal on CoverMyMeds but it stating Error.     Called and spoke with Rhoda at [facility](#[phone]).   [facility]# [id number]    Transferred to do an expedited appeal. (Will know about appeal status in 72 hours via fax)    Spoke with Jessica(?) with the expedited appeal.  They are faxing the appeal form so we are able to fax clinicals to them. Provided my direct fax.    Reference#: [id number]    #[phone] (to check status)  Fax#[phone]

## 2024-06-17 ENCOUNTER — HOSPITAL ENCOUNTER (OUTPATIENT)
Dept: PULMONOLOGY | Facility: MEDICAL CENTER | Age: 84
End: 2024-06-17
Attending: INTERNAL MEDICINE
Payer: MEDICARE

## 2024-06-17 PROCEDURE — G0238 OTH RESP PROC, INDIV: HCPCS

## 2024-06-17 PROCEDURE — G0237 THERAPEUTIC PROCD STRG ENDUR: HCPCS

## 2024-06-17 PROCEDURE — G0239 OTH RESP PROC, GROUP: HCPCS

## 2024-06-18 NOTE — TELEPHONE ENCOUNTER
Called and spoke with Christy with the appeal dept. She said it was denied. I asked the reason why? This is continuation of tx and does meet criteria.     She said she can ask for the appeal to be reopened. I would have to call back to check if it was reopen or not. If it doesn't it will be re-direct for an independent review.

## 2024-06-19 ENCOUNTER — HOSPITAL ENCOUNTER (OUTPATIENT)
Dept: PULMONOLOGY | Facility: MEDICAL CENTER | Age: 84
End: 2024-06-19
Attending: INTERNAL MEDICINE
Payer: MEDICARE

## 2024-06-19 PROCEDURE — G0237 THERAPEUTIC PROCD STRG ENDUR: HCPCS

## 2024-06-19 PROCEDURE — G0238 OTH RESP PROC, INDIV: HCPCS

## 2024-06-19 PROCEDURE — G0239 OTH RESP PROC, GROUP: HCPCS

## 2024-06-19 NOTE — TELEPHONE ENCOUNTER
Called and spoke with Christy.  It was still denied.  They didn't accept the open the appeal.  Now its going to go to the independent appeal C2C.  #[phone].  Fax# [phone]. Case# [id number]

## 2024-06-24 ENCOUNTER — HOSPITAL ENCOUNTER (OUTPATIENT)
Dept: PULMONOLOGY | Facility: MEDICAL CENTER | Age: 84
End: 2024-06-24
Attending: INTERNAL MEDICINE
Payer: MEDICARE

## 2024-06-24 PROCEDURE — G0238 OTH RESP PROC, INDIV: HCPCS

## 2024-06-24 PROCEDURE — G0239 OTH RESP PROC, GROUP: HCPCS

## 2024-06-24 PROCEDURE — G0237 THERAPEUTIC PROCD STRG ENDUR: HCPCS

## 2024-06-25 NOTE — TELEPHONE ENCOUNTER
Called and spoke with pt re: Pirfenidone-Pt gets from . Rx good through until the end of the year.

## 2024-08-02 ENCOUNTER — TELEPHONE (OUTPATIENT)
Dept: SLEEP MEDICINE | Facility: MEDICAL CENTER | Age: 84
End: 2024-08-02
Payer: MEDICARE

## 2024-08-02 NOTE — TELEPHONE ENCOUNTER
Gaby from [facility] called requesting an order placement for patients  device that recently broke they would like to speak with an MA or the provider over the phone at [phone]     Press option 9 to speak with pharmacy

## 2024-08-05 ENCOUNTER — HOSPITAL ENCOUNTER (OUTPATIENT)
Dept: LAB | Facility: MEDICAL CENTER | Age: 84
End: 2024-08-05
Attending: INTERNAL MEDICINE
Payer: MEDICARE

## 2024-08-05 DIAGNOSIS — J84.9 ILD (INTERSTITIAL LUNG DISEASE) (HCC): ICD-10-CM

## 2024-08-05 DIAGNOSIS — I27.20 PULMONARY HYPERTENSION (HCC): ICD-10-CM

## 2024-08-05 LAB
ALBUMIN SERPL BCP-MCNC: 4.5 G/DL (ref 3.2–4.9)
ALBUMIN/GLOB SERPL: 1.6 G/DL
ALP SERPL-CCNC: 53 U/L (ref 30–99)
ALT SERPL-CCNC: 16 U/L (ref 2–50)
ANION GAP SERPL CALC-SCNC: 12 MMOL/L (ref 7–16)
AST SERPL-CCNC: 31 U/L (ref 12–45)
BILIRUB SERPL-MCNC: 0.8 MG/DL (ref 0.1–1.5)
BUN SERPL-MCNC: 25 MG/DL (ref 8–22)
CALCIUM ALBUM COR SERPL-MCNC: 9.3 MG/DL (ref 8.5–10.5)
CALCIUM SERPL-MCNC: 9.7 MG/DL (ref 8.4–10.2)
CHLORIDE SERPL-SCNC: 96 MMOL/L (ref 96–112)
CO2 SERPL-SCNC: 32 MMOL/L (ref 20–33)
CREAT SERPL-MCNC: 0.9 MG/DL (ref 0.5–1.4)
GFR SERPLBLD CREATININE-BSD FMLA CKD-EPI: 84 ML/MIN/1.73 M 2
GLOBULIN SER CALC-MCNC: 2.9 G/DL (ref 1.9–3.5)
GLUCOSE SERPL-MCNC: 166 MG/DL (ref 65–99)
POTASSIUM SERPL-SCNC: 3.7 MMOL/L (ref 3.6–5.5)
PROT SERPL-MCNC: 7.4 G/DL (ref 6–8.2)
SODIUM SERPL-SCNC: 140 MMOL/L (ref 135–145)

## 2024-08-05 PROCEDURE — 36415 COLL VENOUS BLD VENIPUNCTURE: CPT

## 2024-08-05 PROCEDURE — 80053 COMPREHEN METABOLIC PANEL: CPT

## 2024-08-05 NOTE — TELEPHONE ENCOUNTER
Spoke with patient and was not aware of this message being sent to us through [employer]. Patient was confused and stated this was already taken care of.

## 2024-08-13 ENCOUNTER — TELEPHONE (OUTPATIENT)
Dept: CARDIOLOGY | Facility: MEDICAL CENTER | Age: 84
End: 2024-08-13
Payer: MEDICARE

## 2024-08-13 NOTE — TELEPHONE ENCOUNTER
Chart Prep     Spoke to patient in regards to records for NP appointment with Dr. Otoole on 08/15/2024. Patient has  been treated by a cardiologist at [facility] and [facility]. Confirmed appointment date, time and location.

## 2024-08-14 NOTE — TELEPHONE ENCOUNTER
Pt called to advise he was seen with Dr. Guzman and that [facility] has all his records. He gave the wrong providers name initially which was his pulmonary .     Thank you  Lida LEHMAN

## 2024-08-15 ENCOUNTER — OFFICE VISIT (OUTPATIENT)
Dept: CARDIOLOGY | Facility: MEDICAL CENTER | Age: 84
End: 2024-08-15
Attending: INTERNAL MEDICINE
Payer: MEDICARE

## 2024-08-15 ENCOUNTER — TELEPHONE (OUTPATIENT)
Dept: CARDIOLOGY | Facility: MEDICAL CENTER | Age: 84
End: 2024-08-15
Payer: MEDICARE

## 2024-08-15 ENCOUNTER — TELEPHONE (OUTPATIENT)
Dept: SLEEP MEDICINE | Facility: MEDICAL CENTER | Age: 84
End: 2024-08-15
Payer: MEDICARE

## 2024-08-15 VITALS
WEIGHT: 165 LBS | BODY MASS INDEX: 25.01 KG/M2 | HEART RATE: 86 BPM | SYSTOLIC BLOOD PRESSURE: 110 MMHG | RESPIRATION RATE: 16 BRPM | OXYGEN SATURATION: 95 % | HEIGHT: 68 IN | DIASTOLIC BLOOD PRESSURE: 58 MMHG

## 2024-08-15 DIAGNOSIS — Q21.12 PFO (PATENT FORAMEN OVALE): ICD-10-CM

## 2024-08-15 DIAGNOSIS — Z99.81 CHRONIC HYPOXIC RESPIRATORY FAILURE, ON HOME OXYGEN THERAPY (HCC): ICD-10-CM

## 2024-08-15 DIAGNOSIS — I48.19 PERSISTENT ATRIAL FIBRILLATION (HCC): ICD-10-CM

## 2024-08-15 DIAGNOSIS — I27.20 PULMONARY HYPERTENSION (HCC): Primary | ICD-10-CM

## 2024-08-15 DIAGNOSIS — E78.5 HYPERLIPIDEMIA LDL GOAL <100: ICD-10-CM

## 2024-08-15 DIAGNOSIS — J96.11 CHRONIC HYPOXIC RESPIRATORY FAILURE, ON HOME OXYGEN THERAPY (HCC): ICD-10-CM

## 2024-08-15 DIAGNOSIS — J84.9 ILD (INTERSTITIAL LUNG DISEASE) (HCC): ICD-10-CM

## 2024-08-15 LAB — EKG IMPRESSION: NORMAL

## 2024-08-15 PROCEDURE — 99204 OFFICE O/P NEW MOD 45 MIN: CPT | Performed by: INTERNAL MEDICINE

## 2024-08-15 PROCEDURE — 99213 OFFICE O/P EST LOW 20 MIN: CPT | Performed by: INTERNAL MEDICINE

## 2024-08-15 PROCEDURE — 93005 ELECTROCARDIOGRAM TRACING: CPT | Performed by: INTERNAL MEDICINE

## 2024-08-15 PROCEDURE — G2211 COMPLEX E/M VISIT ADD ON: HCPCS | Performed by: INTERNAL MEDICINE

## 2024-08-15 PROCEDURE — 3074F SYST BP LT 130 MM HG: CPT | Performed by: INTERNAL MEDICINE

## 2024-08-15 PROCEDURE — 93010 ELECTROCARDIOGRAM REPORT: CPT | Performed by: INTERNAL MEDICINE

## 2024-08-15 PROCEDURE — 3078F DIAST BP <80 MM HG: CPT | Performed by: INTERNAL MEDICINE

## 2024-08-15 ASSESSMENT — FIBROSIS 4 INDEX: FIB4 SCORE: 3.72

## 2024-08-15 NOTE — TELEPHONE ENCOUNTER
----- Message from Physician Ge Otoole M.D. sent at 8/15/2024  2:05 PM PDT -----  Can u plz arrange R and C with NO with my next available. Thank you!

## 2024-08-15 NOTE — TELEPHONE ENCOUNTER
Schedule R&L hrt w/nitric oxide with Dr. Ge Otoole. Emailed Dr. Jain to [facility] to have Dr. Cano.

## 2024-08-15 NOTE — PROGRESS NOTES
CARDIOLOGY new PATIENT:    PCP: NINA Jane.    Referring provider: Dr. Jacklyn Garcia    1. Pulmonary hypertension (HCC)    2. Persistent atrial fibrillation (HCC)    3. ILD (interstitial lung disease) (HCC)    4. PFO (patent foramen ovale)    5. Chronic hypoxic respiratory failure, on home oxygen therapy (HCC)    6. Hyperlipidemia LDL goal <100        Pablo Kraus is referred for pulm hypertension cardiac evaluation    Chief Complaint   Patient presents with    Atrial Fibrillation    Hypertension    Hyperlipidemia       History: Pablo Kraus is a 83 y.o. male with PAH/chronic hypersensitivity pneumonitis/ILD , permanent A-fib on chronic anticoagulation, chronic HFpEF, primary hypertension and dyslipidemia is here for cardiovascular evaluation in the setting of his pulmonary hypertension.    He has had a diagnosis from Dr. Anderson, confirmed by Dr. 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 He is using 3L of oxygen. Established at [facility] as well. Last clinc apt with them 6/2024.    - right heart catheterization on 4/5/21 showed PA 47/18 (mean 31), PCW 15, PVR 3.5 OQUENDO (TdCO), TPG 16, TdCI 2.27. 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    - echocardiogram from [place] on 10/28/21 showed LVEF 60%, concentric LVH, and moderate diastolic dysfunction, RV normal size and function, RVSP 51 mmhg, severe biatrial dilation, sclerotic trileaflet aortic valve with mild aortic stenosis and aortic regurgitation, mild tricuspid regurgitation, mild mitral regurgitation, no pericardial effusion.     - echo on 7/27/22 showed normal LV size and function, EF 64%, mild concentric LVH, biatrial enlargement, moderate pulmonic regurgitation, mild-to-moderate  "aortic regurgitation, in atrial fibrillation with controlled ventricular rate. RVSP 38.2    - echo 2/14/23 showed normal LVEF 65%, RV mildly dilated, right atrium severely dilated, PASP elevated 40-45, mild AR, no pericardial effusion. Normal IVC. Atrial fibrillation.     Reportedly had negative stress test ~ 8 years ago. Taking lasix 40mg in AM.    He noticed progressively worsening shortness of breath over the last 6 months but not very limiting to his ADLs.  Has stable chronic lower extremity edema.  Denies orthopnea or PND.  Denies syncope or presyncope.  Has occasional palpitations and lightheadedness.  Denies chest pain.    He unfortunately lost his wife recently due to cancer, was on home hospice.    Social:  Quit smoking 1984  Rare alcohol  2 daughters, 7 great grandkids      Functional status:    NYHA Class: II-III  I: no symptoms with activity. Normal  II. Mild symptoms with normal physical activity and comfortable at rest.  III: Moderate symptoms with less than normal physical activity. Only comfortable at rest  IV: Severe symptoms with symptoms of heart failure, even at rest.      PE:  /58 (BP Location: Left arm, Patient Position: Sitting, BP Cuff Size: Adult)   Pulse 86   Resp 16   Ht 1.727 m (5' 8\")   Wt 74.8 kg (165 lb)   SpO2 95%   BMI 25.09 kg/m²     Gen: no acute distress, no conversational dyspnea  HEENT: Symmetric face.  NECK: + JVD.   CARDIAC: Regular, Normal S1, S2, +systolic murmur  VASCULATURE: carotids are normal bilaterally without bruit  RESP: Diffuse decreased air entry without wheezing or crackles  ABD: Soft, non-tender, non-distended  EXT: Trace lower extremity edema  SKIN: Warm and dry  NEURO: No gross deficits  PSYCH: Appropriate affect, participates in conversation    The ASCVD Risk score (Hyampom DK, et al., 2019) failed to calculate.    Past Medical History:   Diagnosis Date    Arrhythmia     6-7 years ago    Blood clotting disorder (HCC)     Eliquis    Breath shortness  " "   \"gradually over 20 years\"    Cancer ([facility]) 2000    Prostate    Hiatus hernia syndrome     6-7 years ago    High cholesterol     15 years ago    Hypertension     Cozaar    Malignant neoplasm of prostate (HCC) 08/18/2012    Other dyspnea and respiratory abnormality 12/16/2010    Oxygen dependent 04/20/2021    3 Liters    Pneumonia     60 years ago    Pneumonitis, hypersensitivity, sheela (HCC) 09/28/2016    Shortness of breath      Past Surgical History:   Procedure Laterality Date    LA COLONOSCOPY,DIAGNOSTIC N/A 4/27/2021    Procedure: COLONOSCOPY;  Surgeon: Lalit Negron M.D.;  Location: SURGERY SAME DAY [facility];  Service: Gastroenterology    OTHER CARDIAC SURGERY  2016    Ablation    APPENDECTOMY      PROSTATECTOMY, RADICAL RETRO      TONSILLECTOMY       No Known Allergies  Outpatient Encounter Medications as of 8/15/2024   Medication Sig Dispense Refill    Pirfenidone 267 MG Tab Take 3 Tablets by mouth in the morning, at noon, and at bedtime. 270 Tablet 3    Treprostinil (TYVASO) 0.6 MG/ML Solution 15 inhalations 4 times daily 2.9 mL 12    potassium chloride ER (KLOR-CON) 10 MEQ tablet Take 10 mEq by mouth every day.      vitamin e (VITAMIN E) 400 UNIT Cap Take 180 mg by mouth every day.      simvastatin (ZOCOR) 40 MG Tab Take 40 mg by mouth.      furosemide (LASIX) 20 MG Tab Take 1 tablet by mouth once daily      Home Care Oxygen Inhale continuous. 3 LNC 24 hours day      hydroCHLOROthiazide (HYDRODIURIL) 25 MG Tab Take 25 mg by mouth every day.      apixaban (ELIQUIS) 5mg Tab Take 5 mg by mouth.      losartan (COZAAR) 100 MG Tab Take 100 mg by mouth every day.      acetaminophen (TYLENOL) 325 MG Tab Take 325 mg by mouth.      [DISCONTINUED] Pirfenidone 267 MG Tab       [DISCONTINUED] Treprostinil (TYVASO) 0.6 MG/ML Solution Inhale.       No facility-administered encounter medications on file as of 8/15/2024.     Social History     Socioeconomic History    Marital status:      Spouse name: Not on " "file    Number of children: Not on file    Years of education: Not on file    Highest education level: Associate degree: academic program   Occupational History    Not on file   Tobacco Use    Smoking status: Former     Current packs/day: 0.00     Average packs/day: 1 pack/day for 35.0 years (35.0 ttl pk-yrs)     Types: Cigarettes, Cigars     Start date: 1950     Quit date: 1985     Years since quittin.6    Smokeless tobacco: Never    Tobacco comments:     Quit smoking twice during 34 years; final was    Vaping Use    Vaping status: Never Used   Substance and Sexual Activity    Alcohol use: Not Currently     Comment: \"One or two measured 1 oz drinks with my wife before dinner.\"    Drug use: Never    Sexual activity: Not on file   Other Topics Concern    Not on file   Social History Narrative    Not on file     Social Determinants of Health     Financial Resource Strain: Low Risk  (3/27/2023)    Received from [facility]    Overall Financial Resource Strain (CARDIA)     Difficulty of Paying Living Expenses: Not hard at all   Food Insecurity: No Food Insecurity (3/27/2023)    Received from [facility]    Hunger Vital Sign     Worried About Running Out of Food in the Last Year: Never true     Ran Out of Food in the Last Year: Never true   Transportation Needs: No Transportation Needs (3/27/2023)    Received from [facility]    PRAPARE - Transportation     Lack of Transportation (Medical): No     Lack of Transportation (Non-Medical): No   Physical Activity: Patient Declined (3/27/2023)    Received from [facility]    Exercise Vital Sign     Days of Exercise per Week: Patient declined     Minutes of Exercise per Session: Patient declined   Stress: Patient Declined (3/27/2023)    [facility]    [place] [employer] - Occupational Stress Questionnaire     Feeling of Stress : Patient declined   Social Connections: Patient Declined (3/27/2023)    [facility]    Social Connection and Isolation Panel [NHANES]     Frequency of Communication with Friends and Family: Patient declined     Frequency of Social Gatherings with Friends and Family: Patient declined     Attends Islam Services: Patient declined     Active Member of Clubs or Organizations: Patient declined     Attends Club or Organization Meetings: Patient declined     Marital Status: Patient declined   Intimate Partner Violence: Patient Declined (3/27/2023)    Received from [facility]    Humiliation, Afraid, Rape, and Kick questionnaire     Fear of Current or Ex-Partner: Patient declined     Emotionally Abused: Patient declined     Physically Abused: Patient declined     Sexually Abused: Patient declined   Housing Stability: Unknown (3/27/2023)    [facility]    Housing Stability Vital Sign     Unable to Pay for Housing in the Last Year: No     Number of Places Lived in the Last Year: Not on file     Unstable Housing in the Last Year: Not on file     Family History   Problem Relation Age of Onset    Hypertension Mother     Heart Disease Mother         CABG         Studies    No results found for: \"TSHULTRASEN\"   No results found for: \"FREET4\"   No results found for: \"HBA1C\"  No results found for: \"CHOLSTRLTOT\", \"LDL\", \"HDL\", \"TRIGLYCERIDE\"    Lab Results   Component Value Date/Time    SODIUM 140 08/05/2024 04:01 PM    POTASSIUM 3.7 08/05/2024 04:01 PM    CHLORIDE 96 08/05/2024 04:01 PM    CO2 32 08/05/2024 04:01 PM    GLUCOSE 166 (H) 08/05/2024 04:01 PM    BUN 25 (H) 08/05/2024 04:01 PM    CREATININE 0.90 08/05/2024 04:01 PM     Lab Results   Component Value Date/Time    ALKPHOSPHAT 53 08/05/2024 " 04:01 PM    ASTSGOT 31 2024 04:01 PM    ALTSGPT 16 2024 04:01 PM    TBILIRUBIN 0.8 2024 04:01 PM        Echocardiogram:  No results found for this or any previous visit.    EC/15/2024 personally reviewed and interpreted  Atrial fibrillation    Assessment and Recommendations:    Problem List Items Addressed This Visit          Cardiology Medicine Problems    Atrial fibrillation (HCC)    Relevant Orders    EKG (Completed)    Hyperlipidemia LDL goal <100       Other    ILD (interstitial lung disease) (HCC)    Pulmonary hypertension (HCC) - Primary    Relevant Orders    CL-RIGHT AND LEFT HEART CATHETERIZATION W/NITRIC OXIDE    PFO (patent foramen ovale)    Relevant Orders    CL-RIGHT AND LEFT HEART CATHETERIZATION W/NITRIC OXIDE    Chronic hypoxic respiratory failure, on home oxygen therapy (HCC)    Relevant Orders    CL-RIGHT AND LEFT HEART CATHETERIZATION W/NITRIC OXIDE     Reviewed his history and above cardiac workup in details with him.    Given his slowly progressive shortness of breath in the setting of known PH (with clinical concerns of mixed pre and postcapillary pulmonary hypertension), I discussed with him the risks and benefits of proceeding with right heart catheterization with possible nitric oxide administration and shunt run/left heart catheterization/coronary angiography for obtaining more data points regarding his PH state; and accordingly will comanage his medications with referring pulmonology PH specialist Dr. Garcia.  Discussed with him as well that if there are any concerning obstructive CAD findings, we will most likely proceed with PCI as he is less likely to be a suitable CABG candidate given his advanced age and chronic hypoxic respiratory failure and underlying PH /lung fibrosis.  He agrees.    Will have to hold apixaban for 8 hours before the procedure.   notified.    No medication changes today, pending heart cath results.    Given age and chronic persistent  atrial fibrillation state, and lung condition, will not pursue rhythm control strategies at this time until heart cath results.  On apixaban for stroke prevention.    Thank you for the opportunity to be involved in Pablo Kraus 's  complex cardiovascular care; and please reach out with any questions or concerns.     () Today's E/M visit is associated with medical care services that serve as the continuing focal point for all needed health care services and/or with medical care services that  are part of ongoing cardiac care related to a patient's single, serious condition, or a complex condition: This includes  furnishing services to patients on an ongoing basis that result in care that is personalized  to the patient. The services result in a comprehensive, longitudinal, and continuous  relationship with the patient and involve delivery of team-based care that is accessible, coordinated with other practitioners and providers, and integrated with the broader health  care landscape.     Return in about 3 months (around 11/15/2024).    Ge Otoole MD, MPH [facility]  Interventional Cardiologist  [facility]   of Clinical Internal Medicine - [facility]    ~ Portions of this note were completed using voice recognition software (Dragon Naturally speaking software) . Occasional transcription errors may have escaped proof reading. I have made every reasonable attempt to correct obvious errors, but I expect that there are errors of grammar and possibly content that I did not discover before finalizing the note. ~

## 2024-08-15 NOTE — TELEPHONE ENCOUNTER
Upcoming appt: 08/21/24 with Dr Garcia    Test not completed: Lab: proBrain Natriuretic Peptide, NT    Outcome. Called and spoke with [facility], they stated : ABN flagged, pt didn't want the test.

## 2024-08-18 ASSESSMENT — ENCOUNTER SYMPTOMS
HEARTBURN: 0
COUGH: 0
HEMOPTYSIS: 0
SHORTNESS OF BREATH: 1
HEADACHES: 0
SPUTUM PRODUCTION: 0
FEVER: 0
CHILLS: 0
PALPITATIONS: 0
WHEEZING: 0
DIZZINESS: 0

## 2024-08-18 NOTE — PROGRESS NOTES
Pulmonary Hypertension Clinic- Follow up    Date of Service: 8/21/24    Referring Physician: Mary Diggs A.P.*    Reason for Consult: ILD/Pulmonary Hypertension    Chief Complaint:   No chief complaint on file.      HPI:   Pablo Kraus is a 83 y.o. male who is followed by Dr. 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  He has been assessed and managed by ILD/PH specialists at [facility] Drew Kimble and Yury Mcintosh. He is on Pirfenidone and Tyvaso (16 puffs QID) and states he tolerates these medications well.     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  He then avoided contact with birds and moved to [facility] where he reports he was asymptomatic for many years.  He does endorse about 5 times throughout his adult life where he had very high fevers (once in [place]) and was admitted to hospital for a short time but never told what his diagnosis was.  He states he was well (hiking and climbing) until about 6 years ago (2018) when he took a trip to [place] and had a flare of his lung disease which set him on a downward trajectory that he continues on now.  He is living in [facility] and would like to move his care from [place] and [facility] to [facility] for ease of getting to and from appointments.     He has had a diagnosis from Dr. Anderson, confirmed by Dr. 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  He is using 3L of oxygen today.      Per our Care Everywhere records it appears he started to see DR. 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  He was started on Cellcept at that time.  In 12/2020 he was noted to be fluid overloaded and concern for PH triggered a referral to   [facility]. AT that time he was on cellcept 1000mg BID.    He saw DR. Mcintosh in March 2021 and was diagnosed with PH via RHC showing precapillary PH.  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     6/21 - 2-3LPM supplemental O2, still on cellcept, on 8 breaths QID of Tyvaso.      2/22 - Titrated up to 15 puffs QID with significant symptomatic improvement.  RVSP down to 40.     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 3-4 LPM supplemental O2.     12/23 Last saw Dr. 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     5/17/24: Pablo is doing well.  Symptoms are stable off Cellcept thus far.  PFTs demonstrate a severe restriction.     8/21/24: Pablo is doing well physically, he lost his wife on July 20th and is struggling understandably with grief.  His children and grandchildren are all living in [place].  His lung function is essentially stable and he remains on Tyvaso 15 breaths QID and on perfenidone for his progressive pulmonary fibrosis. He recently established care with Dr. Otoole and is scheduled for LHC and RHC for assessment of his cardiac status and pulmonary pressures.  There was also some discussion of closing his PFO which Pablo is unsure if he would proceed with.  We can discuss this further after seeing the status of his pressures.      Functional status:  NYHA Class:   I: no symptoms with activity. Normal  II. Mild symptoms with normal physical activity and comfortable at rest.  III: Moderate symptoms with less than normal physical activity. Only comfortable at rest  IV: Severe symptoms with symptoms of heart failure, even at rest.    Pulmonary Hypertension Workup:  Echo: 2021: Normal EF, RAP 8, RVSP 50mmHg  RHC: (4/5/2021) RA 9 PA 47/18 (31) PCW 15 CO 4.57 PVR 3.5            V/Q  "Scan/CTPA:  HRCT: None available, CT scans in PACS are empty files  CTD Panel: negative at [facility]      Past Medical History:   Diagnosis Date    Arrhythmia     6-7 years ago    Blood clotting disorder (HCC)     Eliquis    Breath shortness     \"gradually over 20 years\"    Cancer (HCC) 2000    Prostate    Hiatus hernia syndrome     6-7 years ago    High cholesterol     15 years ago    Hypertension     Cozaar    Malignant neoplasm of prostate (HCC) 2012    Other dyspnea and respiratory abnormality 2010    Oxygen dependent 2021    3 Liters    Pneumonia     60 years ago    Pneumonitis, hypersensitivity, sheela (HCC) 2016    Shortness of breath        Past Surgical History:   Procedure Laterality Date    IA COLONOSCOPY,DIAGNOSTIC N/A 2021    Procedure: COLONOSCOPY;  Surgeon: Lalit Negron M.D.;  Location: SURGERY SAME DAY [facility];  Service: Gastroenterology    OTHER CARDIAC SURGERY      Ablation    APPENDECTOMY      PROSTATECTOMY, RADICAL RETRO      TONSILLECTOMY         Social History     Socioeconomic History    Marital status:      Spouse name: Not on file    Number of children: Not on file    Years of education: Not on file    Highest education level: Associate degree: academic program   Occupational History    Not on file   Tobacco Use    Smoking status: Former     Current packs/day: 0.00     Average packs/day: 1 pack/day for 35.0 years (35.0 ttl pk-yrs)     Types: Cigarettes, Cigars     Start date: 1950     Quit date: 1985     Years since quittin.6    Smokeless tobacco: Never    Tobacco comments:     Quit smoking twice during 34 years; final was    Vaping Use    Vaping status: Never Used   Substance and Sexual Activity    Alcohol use: Not Currently     Comment: \"One or two measured 1 oz drinks with my wife before dinner.\"    Drug use: Never    Sexual activity: Not on file   Other Topics Concern    Not on file   Social History Narrative    Not on file "     Social Determinants of Health     Financial Resource Strain: Low Risk  (3/27/2023)    [facility]    Overall Financial Resource Strain (CARDIA)     Difficulty of Paying Living Expenses: Not hard at all   Food Insecurity: No Food Insecurity (3/27/2023)    [facility]    Hunger Vital Sign     Worried About Running Out of Food in the Last Year: Never true     Ran Out of Food in the Last Year: Never true   Transportation Needs: No Transportation Needs (3/27/2023)    Received from [facility]    PRAPARE - Transportation     Lack of Transportation (Medical): No     Lack of Transportation (Non-Medical): No   Physical Activity: Patient Declined (3/27/2023)    Received from [facility]    Exercise Vital Sign     Days of Exercise per Week: Patient declined     Minutes of Exercise per Session: Patient declined   Stress: Patient Declined (3/27/2023)    [facility]    [employer] - Occupational Stress Questionnaire     Feeling of Stress : Patient declined   Social Connections: Patient Declined (3/27/2023)    [facility]    Social Connection and Isolation Panel [NHANES]     Frequency of Communication with Friends and Family: Patient declined     Frequency of Social Gatherings with Friends and Family: Patient declined     Attends Judaism Services: Patient declined     Active Member of Clubs or Organizations: Patient declined     Attends Club or Organization Meetings: Patient declined     Marital Status: Patient declined   Intimate Partner Violence: Patient Declined (3/27/2023)    Received from [facility]    Humiliation, Afraid, Rape, and Kick questionnaire     Fear of Current or  Ex-Partner: Patient declined     Emotionally Abused: Patient declined     Physically Abused: Patient declined     Sexually Abused: Patient declined   Housing Stability: Unknown (3/27/2023)    [facility]    Housing Stability Vital Sign     Unable to Pay for Housing in the Last Year: No     Number of Places Lived in the Last Year: Not on file     Unstable Housing in the Last Year: Not on file          Family History   Problem Relation Age of Onset    Hypertension Mother     Heart Disease Mother         CABG       Current Outpatient Medications on File Prior to Visit   Medication Sig Dispense Refill    Pirfenidone 267 MG Tab Take 3 Tablets by mouth in the morning, at noon, and at bedtime. 270 Tablet 3    Treprostinil (TYVASO) 0.6 MG/ML Solution 15 inhalations 4 times daily 2.9 mL 12    potassium chloride ER (KLOR-CON) 10 MEQ tablet Take 10 mEq by mouth every day.      vitamin e (VITAMIN E) 400 UNIT Cap Take 180 mg by mouth every day.      simvastatin (ZOCOR) 40 MG Tab Take 40 mg by mouth.      furosemide (LASIX) 20 MG Tab Take 1 tablet by mouth once daily      Home Care Oxygen Inhale continuous. 3 LNC 24 hours day      hydroCHLOROthiazide (HYDRODIURIL) 25 MG Tab Take 25 mg by mouth every day.      apixaban (ELIQUIS) 5mg Tab Take 5 mg by mouth.      losartan (COZAAR) 100 MG Tab Take 100 mg by mouth every day.      acetaminophen (TYLENOL) 325 MG Tab Take 325 mg by mouth.       No current facility-administered medications on file prior to visit.       Allergies: Patient has no known allergies.      ROS:   Review of Systems   Constitutional:  Negative for chills, fever and malaise/fatigue.   HENT:  Negative for congestion.    Respiratory:  Positive for shortness of breath. Negative for cough, hemoptysis, sputum production and wheezing.    Cardiovascular:  Negative for chest pain, palpitations and leg swelling.   Gastrointestinal:  Negative for heartburn.  "  Neurological:  Negative for dizziness and headaches.   Endo/Heme/Allergies:  Positive for environmental allergies.       Vitals:  BP 98/52 (BP Location: Left arm, Patient Position: Sitting, BP Cuff Size: Adult)   Pulse 62   Ht 1.727 m (5' 8\")   Wt 75.8 kg (167 lb)   SpO2 98%     Physical Exam:  Physical Exam  Constitutional:       Appearance: Normal appearance.   HENT:      Head: Atraumatic.      Mouth/Throat:      Mouth: Mucous membranes are dry.   Cardiovascular:      Rate and Rhythm: Normal rate. Rhythm irregular.      Heart sounds: Murmur heard.   Pulmonary:      Effort: Pulmonary effort is normal. No respiratory distress.      Breath sounds: No wheezing or rales.      Comments: Crackles bilaterally at bases  Abdominal:      General: Abdomen is flat.   Musculoskeletal:         General: No swelling.   Skin:     General: Skin is warm and dry.   Neurological:      General: No focal deficit present.      Mental Status: He is alert and oriented to person, place, and time.           Vaccinations:    Pneumovax: Complete  Covid: Vaccinated  Annual Flu: Due    Pertinent Studies:  Laboratory Data:  Cocci serologies negative 4/26/24      6MWT:  3/3/21: 263m on room air    PFTs as reviewed by me personally show:          PFT Trend:  FVC(%):  FEV1 (%): TLC(%):  DLCO(%)  Date:  8/21/24      1.70L(49%), 1.39L(54%), 3.23L (48%), 9.62(43%)  5/17/24      1.64L(50%), 1.39L(59%), 3.31L(49%), 8.28(37%)  8/30/22      3.5L(53%), 2.45L(59%), 2.72L(42%), 10.05(38%)    Imaging as reviewed by me personally show:    HRCT: 11/28/23:      Pertinent Cardiac Studies:  Echo:    RHC:  4/5/21:  Right Heart Cath (4/5/2021) RA 9 PA 47/18 (31) PCW 15 CO 4.57 PVR 3.5                        POST PROCEDURAL DIAGNOSIS/CONCLUSIONS:   Pulmonary Hypertension                                             1. Borderline elevated right (RAP 9 mmHg) and left (PAWP 15 mmHg) sided filling pressures   2. Normal cardiac output(index) via both Haroldo 5.5 L/min " (2.7 L/min/m2) and TDCO 4.6 L/min (2.3 L/min/m2)   3. Combined pre- and post-capillary pulmonary hypertension with mPA 31 mmHg, TPG 16 mmHg, and PVR 3.5 OQUENDO (using TDCO)   4. Brachial vein hemostasis with manual compression     Assessment/Plan:    Problem List Items Addressed This Visit       ILD (interstitial lung disease) (HCC)     Chronic progressive fibrosis 2/2 chronic hypersensitivity pneumonitis from sheela antigen.  Plan:  - Continue Pirfenidone  - continue O2 at 3-6L depending on exertion  - Continue Tyvaso  - Follow up in December         Pulmonary hypertension (HCC)     Patient has a history of CHP for many years 2/2 sheela exposure. His CHP was controlled well with immunosuppression with cellcept 1000mg BID and avoidance of exposures.  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  He developed signs of UIP/IPF in 2023 and was started on Pirfenidone.  He has concurrently been followed by [facility] for combined Group I and III PH, treated with Tyvaso at 15 puffs QID with good symptomatic response and Echo decreased RVSP to 40.    Plan:  - 6 MWT 6 months  - PFT 6 months  - Pulmonary Rehab Referral - complete program  - Continue Tyvaso 15puffs QID - Rx sent [facility]  - Continue Pirfenidone 3 tabs TID (801mg TID) - This Rx must go to the  as he obtains it through a drug assistance program.  It has been repeatedly denied by his insurance despite its clear indication.   - CMP every 3 months to assess creatinine and liver function  - BNP q 3 months  - Follow up in 3 months.         Relevant Orders    Basic Metabolic Panel     Other Visit Diagnoses       Chronic right-sided heart failure (HCC)        Relevant Orders    proBrain Natriuretic Peptide, NT    Basic Metabolic Panel                 Return in about 3 months (around 11/21/2024) for Dr. Garcia in PULMONARY HYPERTENSION CLINIC.     This note was generated using voice recognition software which has a chance of  producing errors of grammar and possibly content.  I have made every reasonable attempt to find and correct any obvious errors, but it should be expected that some may not be found prior to finalization of this note.    Time spent in record review prior to patient arrival, reviewing results, and in face-to-face encounter totaled 45 min, excluding any procedures if performed.      Jacklyn Garcia MD RD  Pulmonary and Critical Care Medicine  [facility] hospital

## 2024-08-20 ENCOUNTER — HOSPITAL ENCOUNTER (OUTPATIENT)
Dept: LAB | Facility: MEDICAL CENTER | Age: 84
End: 2024-08-20
Attending: INTERNAL MEDICINE
Payer: MEDICARE

## 2024-08-20 NOTE — TELEPHONE ENCOUNTER
Patient is scheduled on 9-6-24 for a R&L hrt w/nitric oxide with Dr. Ge Otoole. Patient was told to hold Eliquis for 2 days prior and to check in at 6:30 for an 8:30 procedure. H&P was done on 8-15-24 by Dr.Jad Otoole. Pre admit to call patient.

## 2024-08-21 ENCOUNTER — NON-PROVIDER VISIT (OUTPATIENT)
Dept: SLEEP MEDICINE | Facility: MEDICAL CENTER | Age: 84
End: 2024-08-21
Attending: INTERNAL MEDICINE
Payer: MEDICARE

## 2024-08-21 ENCOUNTER — APPOINTMENT (OUTPATIENT)
Dept: ADMISSIONS | Facility: MEDICAL CENTER | Age: 84
End: 2024-08-21
Payer: MEDICARE

## 2024-08-21 VITALS — BODY MASS INDEX: 25.31 KG/M2 | WEIGHT: 167 LBS | HEIGHT: 68 IN

## 2024-08-21 VITALS
HEIGHT: 68 IN | WEIGHT: 167 LBS | HEART RATE: 62 BPM | BODY MASS INDEX: 25.31 KG/M2 | SYSTOLIC BLOOD PRESSURE: 98 MMHG | DIASTOLIC BLOOD PRESSURE: 52 MMHG | OXYGEN SATURATION: 98 %

## 2024-08-21 DIAGNOSIS — I27.20 PULMONARY HYPERTENSION (HCC): ICD-10-CM

## 2024-08-21 DIAGNOSIS — R06.02 SOB (SHORTNESS OF BREATH): ICD-10-CM

## 2024-08-21 DIAGNOSIS — I50.812 CHRONIC RIGHT-SIDED HEART FAILURE (HCC): ICD-10-CM

## 2024-08-21 DIAGNOSIS — J84.9 ILD (INTERSTITIAL LUNG DISEASE) (HCC): ICD-10-CM

## 2024-08-21 PROCEDURE — 94729 DIFFUSING CAPACITY: CPT | Performed by: INTERNAL MEDICINE

## 2024-08-21 PROCEDURE — 94060 EVALUATION OF WHEEZING: CPT | Mod: 26 | Performed by: INTERNAL MEDICINE

## 2024-08-21 PROCEDURE — 94726 PLETHYSMOGRAPHY LUNG VOLUMES: CPT | Mod: 26 | Performed by: INTERNAL MEDICINE

## 2024-08-21 PROCEDURE — 94618 PULMONARY STRESS TESTING: CPT | Mod: 26 | Performed by: INTERNAL MEDICINE

## 2024-08-21 PROCEDURE — 94618 PULMONARY STRESS TESTING: CPT | Performed by: INTERNAL MEDICINE

## 2024-08-21 PROCEDURE — 94729 DIFFUSING CAPACITY: CPT | Mod: 26 | Performed by: INTERNAL MEDICINE

## 2024-08-21 PROCEDURE — 94060 EVALUATION OF WHEEZING: CPT | Performed by: INTERNAL MEDICINE

## 2024-08-21 PROCEDURE — 99212 OFFICE O/P EST SF 10 MIN: CPT | Performed by: INTERNAL MEDICINE

## 2024-08-21 PROCEDURE — 94726 PLETHYSMOGRAPHY LUNG VOLUMES: CPT | Performed by: INTERNAL MEDICINE

## 2024-08-21 ASSESSMENT — FIBROSIS 4 INDEX
FIB4 SCORE: 3.72

## 2024-08-21 NOTE — ASSESSMENT & PLAN NOTE
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  Plan:  - Continue Pirfenidone  - continue O2 at 3-6L depending on exertion  - Continue Tyvaso  - Follow up in December

## 2024-08-21 NOTE — ASSESSMENT & PLAN NOTE
Patient has a history of CHP for many years 2/2 sheela exposure. His CHP was controlled well with immunosuppression with cellcept 1000mg BID and avoidance of exposures.  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  He developed signs of UIP/IPF in 2023 and was started on Pirfenidone.  He has concurrently been followed by [facility] for combined Group I and III PH, treated with Tyvaso at 15 puffs QID with good symptomatic response and Echo decreased RVSP to 40.    Plan:  - 6 MWT 6 months  - PFT 6 months  - Pulmonary Rehab Referral - complete program  - Continue Tyvaso 15puffs QID - [facility]  - Continue Pirfenidone 3 tabs TID (801mg TID) - This Rx must go to the  as he obtains it through a drug assistance program.  It has been repeatedly denied by his insurance despite its clear indication.   - CMP every 3 months to assess creatinine and liver function  - BNP q 3 months  - Follow up in 3 months.

## 2024-08-21 NOTE — PROCEDURES
Test done on O2 starting at 3 LPM; increased to 6 LPM by the end of the test. Pt walked 720' or 50%.     6-minute walk test    Patient's baseline vitals on room air were 88%.  He was placed on oxygen at 3 L/min saturating 97% with a blood pressure of 100/60 and a heart rate of 85 bpm.  He was able to ambulate for 2 minutes prior to desaturation.  Oxygen was increased to 4 L/min and then again to 6 L/min after further desaturation.  He was able to ambulate 720 feet (240 m) with the aid of 6 L of oxygen by nasal cannula    IDr. Jacklyn have interpreted and dictated the results of this study.    Jacklyn Garcia MD RD  Pulmonary and Critical Care    Available on [facility]

## 2024-08-21 NOTE — TELEPHONE ENCOUNTER
Patient called and rescheduled his R&L Hrt w/nitric oxide to 9-23-24 with Dr.Jad Otoole. Patient to check in at 9:30 for an 11:30 procedure. Updated H&P to be done on admit by NP. Pre admit to call patient.

## 2024-08-21 NOTE — PROCEDURES
Technician: JUAQUIN Diaz    Technician Comment:  Good patient effort & cooperation.  The results of this test meet the ATS/ERS standards for acceptability & reproducibility.  Test was performed on the [employer] Body Plethysmograph-Elite DX system.  Predicted values were GLI-2012 for spirometry, GLI-2020 for DLCO, ITS for Lung Volumes.  The DLCO was uncorrected for Hgb.  A bronchodilator of Albuterol HFA -2puffs via spacer administered.  DLCO performed during dilation period.    Interpretation:    Pulmonary function test  Date of study 8/21/2024  Interpreting physician: Jacklyn Garcia  Reason for study: Pulmonary arterial hypertension    Interpretation:  1.  Spirometry is suggestive of a restriction  2.  There is no significant change postbronchodilator.  There is a suggestion of bronchial reactivity in the mid flow values which can be seen in reactive airways disease, correlate clinically  3.  The flow-volume loop is consistent with restriction  4.  Lung volumes demonstrate a severe restriction with air trapping with an RV/TLC ratio 45  5.  The diffusion capacity is severely reduced but corrects for alveolar volume  6.  In comparison to the prior study from 5/17/2024 the FVC has increased from 1.64 L to 1.70 L.  The DLCO has also improved from 37 to 43% predicted.    I, Dr. Jacklyn Garcia have interpreted and dictated the results of this study.    Jacklyn Garcia MD RD  Pulmonary and Critical Care    Available on [employer]

## 2024-08-26 ENCOUNTER — HOSPITAL ENCOUNTER (OUTPATIENT)
Dept: RADIOLOGY | Facility: MEDICAL CENTER | Age: 84
End: 2024-08-26
Payer: MEDICARE

## 2024-08-26 DIAGNOSIS — C4A.31: ICD-10-CM

## 2024-08-26 DIAGNOSIS — C4A.9 MERKEL CELL CARCINOMA (HCC): ICD-10-CM

## 2024-08-26 PROCEDURE — 70491 CT SOFT TISSUE NECK W/DYE: CPT

## 2024-08-26 PROCEDURE — 70460 CT HEAD/BRAIN W/DYE: CPT

## 2024-08-26 PROCEDURE — 700117 HCHG RX CONTRAST REV CODE 255

## 2024-08-26 RX ADMIN — IOHEXOL 80 ML: 350 INJECTION, SOLUTION INTRAVENOUS at 09:09

## 2024-08-26 RX ADMIN — IOHEXOL 80 ML: 350 INJECTION, SOLUTION INTRAVENOUS at 09:07

## 2024-09-20 ENCOUNTER — PRE-ADMISSION TESTING (OUTPATIENT)
Dept: ADMISSIONS | Facility: MEDICAL CENTER | Age: 84
End: 2024-09-20
Payer: MEDICARE

## 2024-09-20 ENCOUNTER — APPOINTMENT (OUTPATIENT)
Dept: ADMISSIONS | Facility: MEDICAL CENTER | Age: 84
End: 2024-09-20
Attending: INTERNAL MEDICINE
Payer: MEDICARE

## 2024-09-20 DIAGNOSIS — Z01.812 PRE-OPERATIVE LABORATORY EXAMINATION: ICD-10-CM

## 2024-09-20 DIAGNOSIS — Z01.810 PRE-OPERATIVE CARDIOVASCULAR EXAMINATION: ICD-10-CM

## 2024-09-20 LAB
ALBUMIN SERPL BCP-MCNC: 4.2 G/DL (ref 3.2–4.9)
ALBUMIN/GLOB SERPL: 1.7 G/DL
ALP SERPL-CCNC: 50 U/L (ref 30–99)
ALT SERPL-CCNC: 16 U/L (ref 2–50)
ANION GAP SERPL CALC-SCNC: 8 MMOL/L (ref 7–16)
APTT PPP: 30.6 SEC (ref 24.7–36)
AST SERPL-CCNC: 30 U/L (ref 12–45)
BILIRUB SERPL-MCNC: 0.7 MG/DL (ref 0.1–1.5)
BUN SERPL-MCNC: 21 MG/DL (ref 8–22)
CALCIUM ALBUM COR SERPL-MCNC: 9.8 MG/DL (ref 8.5–10.5)
CALCIUM SERPL-MCNC: 10 MG/DL (ref 8.5–10.5)
CHLORIDE SERPL-SCNC: 97 MMOL/L (ref 96–112)
CO2 SERPL-SCNC: 33 MMOL/L (ref 20–33)
CREAT SERPL-MCNC: 0.97 MG/DL (ref 0.5–1.4)
EKG IMPRESSION: NORMAL
ERYTHROCYTE [DISTWIDTH] IN BLOOD BY AUTOMATED COUNT: 49.6 FL (ref 35.9–50)
GFR SERPLBLD CREATININE-BSD FMLA CKD-EPI: 77 ML/MIN/1.73 M 2
GLOBULIN SER CALC-MCNC: 2.5 G/DL (ref 1.9–3.5)
GLUCOSE SERPL-MCNC: 142 MG/DL (ref 65–99)
HCT VFR BLD AUTO: 39.4 % (ref 42–52)
HGB BLD-MCNC: 12.6 G/DL (ref 14–18)
INR PPP: 1.28 (ref 0.87–1.13)
MCH RBC QN AUTO: 30.7 PG (ref 27–33)
MCHC RBC AUTO-ENTMCNC: 32 G/DL (ref 32.3–36.5)
MCV RBC AUTO: 95.9 FL (ref 81.4–97.8)
PLATELET # BLD AUTO: 185 K/UL (ref 164–446)
PMV BLD AUTO: 9.8 FL (ref 9–12.9)
POTASSIUM SERPL-SCNC: 4 MMOL/L (ref 3.6–5.5)
PROT SERPL-MCNC: 6.7 G/DL (ref 6–8.2)
PROTHROMBIN TIME: 16 SEC (ref 12–14.6)
RBC # BLD AUTO: 4.11 M/UL (ref 4.7–6.1)
SODIUM SERPL-SCNC: 138 MMOL/L (ref 135–145)
WBC # BLD AUTO: 7.2 K/UL (ref 4.8–10.8)

## 2024-09-20 PROCEDURE — 80053 COMPREHEN METABOLIC PANEL: CPT

## 2024-09-20 PROCEDURE — 36415 COLL VENOUS BLD VENIPUNCTURE: CPT

## 2024-09-20 PROCEDURE — 93010 ELECTROCARDIOGRAM REPORT: CPT | Performed by: INTERNAL MEDICINE

## 2024-09-20 PROCEDURE — 85027 COMPLETE CBC AUTOMATED: CPT

## 2024-09-20 PROCEDURE — 85730 THROMBOPLASTIN TIME PARTIAL: CPT

## 2024-09-20 PROCEDURE — 85610 PROTHROMBIN TIME: CPT

## 2024-09-20 PROCEDURE — 93005 ELECTROCARDIOGRAM TRACING: CPT

## 2024-09-22 NOTE — H&P
"History:  Primary Diagnosis: Pulmonary hypertension     HPI:  Pablo Kraus is a patient of Dr. Otoole with significant history of PAH, chronic hypersensitivity pneumonitis, interstitial lung disease, permanent atrial fibrillation on chronic anticoagulation, chronic HFpEF, PFO, primary hypertension and dyslipidemia.  He was seen by  Dr. Otoole 8/15/2024 with concerns of progressively worsening shortness of breath over the past 6 months.  There were concerns for mixed pre and postcapillary pulmonary hypertension.  He was recommended for coronary angiogram.     Today, he reports he is feeling well overall. He does have some chronic shortness of breath which is at baseline. He is on 3L supplemental O2. He also has some lower extremity edema which is alleviated by compression sock use. No chest pain or palpitations. No orthopnea or PND.  No dizziness or lightheadedness. No syncope or presyncope.      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    Cataract 09/20/2024    IOL    Hiatus hernia syndrome     6-7 years ago    High cholesterol     15 years ago    Hypertension     Cozaar    Malignant neoplasm of prostate (HCC) 08/18/2012    Oxygen dependent 09/20/2024    3 Liters continuous    Pneumonia     60 years ago    Pneumonitis, hypersensitivity, sheela (HCC) 09/28/2016    Shortness of breath 09/20/2024    upon exertion       Surgical history:   Past Surgical History:   Procedure Laterality Date    ME COLONOSCOPY,DIAGNOSTIC N/A 04/27/2021    Procedure: COLONOSCOPY;  Surgeon: Lalit Negron M.D.;  Location: SURGERY SAME DAY [facility];  Service: Gastroenterology    OTHER CARDIAC SURGERY  2016    Ablation    APPENDECTOMY      CATARACT EXTRACTION WITH IOL      PROSTATECTOMY, RADICAL RETRO      " TONSILLECTOMY         Social history:   Social History     Tobacco Use   Smoking Status Former    Current packs/day: 0.00    Average packs/day: 1 pack/day for 35.0 years (35.0 ttl pk-yrs)    Types: Cigarettes, Cigars    Start date: 1950    Quit date: 1985    Years since quittin.7    Passive exposure: Past   Smokeless Tobacco Never   Tobacco Comments    Quit smoking twice during 34 years; final was       Social History     Substance and Sexual Activity   Alcohol Use Not Currently      Social History     Substance and Sexual Activity   Drug Use Never        Family history:   Family History   Problem Relation Age of Onset    Hypertension Mother     Heart Disease Mother         CABG       Allergies: No Known Allergies    Home medications: No current facility-administered medications for this encounter.    Current Outpatient Medications:     Pirfenidone 267 MG Tab, Take 3 Tablets by mouth in the morning, at noon, and at bedtime. (Patient taking differently: Take 3 Tablets by mouth in the morning, at noon, and at bedtime. Takes with food), Disp: 270 Tablet, Rfl: 3    Treprostinil (TYVASO) 0.6 MG/ML Solution, 15 inhalations 4 times daily, Disp: 2.9 mL, Rfl: 12    potassium chloride ER (KLOR-CON) 10 MEQ tablet, Take 10 mEq by mouth every morning., Disp: , Rfl:     vitamin e (VITAMIN E) 400 UNIT Cap, Take 180 mg by mouth every day., Disp: , Rfl:     simvastatin (ZOCOR) 40 MG Tab, Take 40 mg by mouth every evening., Disp: , Rfl:     furosemide (LASIX) 20 MG Tab, 20 mg every morning., Disp: , Rfl:     Home Care Oxygen, Inhale continuous. 3 LNC 24 hours day, Disp: , Rfl:     hydroCHLOROthiazide (HYDRODIURIL) 25 MG Tab, Take 25 mg by mouth every morning., Disp: , Rfl:     apixaban (ELIQUIS) 5mg Tab, Take 5 mg by mouth 2 times a day., Disp: , Rfl:     losartan (COZAAR) 100 MG Tab, Take 100 mg by mouth every morning., Disp: , Rfl:     acetaminophen (TYLENOL) 325 MG Tab, Take 325 mg by mouth every evening., Disp: ,  Rfl:     Review of Systems:  Review of Systems   Constitutional: Negative.  Negative for chills, fever and malaise/fatigue.   HENT: Negative.     Eyes: Negative.  Negative for blurred vision and double vision.   Respiratory:  Positive for shortness of breath (on 3L of supplemental O2). Negative for cough.    Cardiovascular:  Positive for leg swelling. Negative for chest pain, palpitations, orthopnea, claudication and PND.   Gastrointestinal: Negative.  Negative for abdominal pain, nausea and vomiting.   Genitourinary: Negative.    Musculoskeletal: Negative.  Negative for myalgias.   Skin: Negative.  Negative for rash.   Neurological: Negative.  Negative for dizziness, loss of consciousness, weakness and headaches.   Endo/Heme/Allergies: Negative.  Does not bruise/bleed easily.   Psychiatric/Behavioral: Negative.         Physical Examination:  Physical Exam  Vitals and nursing note reviewed.   Constitutional:       Appearance: Normal appearance.   HENT:      Head: Normocephalic and atraumatic.      Right Ear: External ear normal.      Left Ear: External ear normal.      Nose: Nose normal.      Mouth/Throat:      Mouth: Mucous membranes are moist.      Pharynx: Oropharynx is clear.   Eyes:      General: No scleral icterus.     Extraocular Movements: Extraocular movements intact.      Conjunctiva/sclera: Conjunctivae normal.      Pupils: Pupils are equal, round, and reactive to light.   Cardiovascular:      Rate and Rhythm: Normal rate and regular rhythm.      Pulses: Normal pulses.      Heart sounds: Normal heart sounds.      Comments: On supplemental O2 via NC  Pulmonary:      Effort: Pulmonary effort is normal.      Breath sounds: Rales present.   Abdominal:      General: Abdomen is flat. There is no distension.   Musculoskeletal:         General: Normal range of motion.      Cervical back: Normal range of motion and neck supple.      Right lower leg: Edema (2+) present.      Left lower leg: Edema (2+) present.    Skin:     General: Skin is warm and dry.      Capillary Refill: Capillary refill takes less than 2 seconds.      Coloration: Skin is not jaundiced.   Neurological:      General: No focal deficit present.      Mental Status: He is alert. Mental status is at baseline.   Psychiatric:         Mood and Affect: Mood normal.         Behavior: Behavior normal.         Impression:  #Pulmonary hypertension  #Interstitial lung disease  #PFO  #Chronic hypoxic respiratory failure    Plan:  Left and right cardiac cath with possible intervention.     The risks, benefits, and alternatives to coronary angiography with IV sedation were discussed in great detail. Specific risks mentioned include bleeding, infection, kidney damage, allergic reaction, cardiac perforation with possible tamponade requiring pericardiocentesis or possibly open heart surgery. In addition, we discussed that 10% of patients will experience small to moderate bruising at the site of the arterial puncture. Lastly, the risks of heart attack, stroke and death were discussed; the risk of major complications such as heart attack or stroke caused by the angiogram is approximately 1%; the risk of death is approximately 1 in 1000. The patient verbalized understanding of the potential complications and wishes to proceed with this procedure.    The risks/benefits of the procedure will be further discussed with the consenting physician performing the procedure.

## 2024-09-23 ENCOUNTER — HOSPITAL ENCOUNTER (OUTPATIENT)
Facility: MEDICAL CENTER | Age: 84
End: 2024-09-23
Attending: INTERNAL MEDICINE | Admitting: INTERNAL MEDICINE
Payer: MEDICARE

## 2024-09-23 ENCOUNTER — APPOINTMENT (OUTPATIENT)
Dept: CARDIOLOGY | Facility: MEDICAL CENTER | Age: 84
End: 2024-09-23
Attending: INTERNAL MEDICINE
Payer: MEDICARE

## 2024-09-23 VITALS
DIASTOLIC BLOOD PRESSURE: 77 MMHG | RESPIRATION RATE: 16 BRPM | TEMPERATURE: 98.4 F | SYSTOLIC BLOOD PRESSURE: 111 MMHG | WEIGHT: 172.4 LBS | BODY MASS INDEX: 26.13 KG/M2 | HEIGHT: 68 IN | HEART RATE: 58 BPM | OXYGEN SATURATION: 93 %

## 2024-09-23 DIAGNOSIS — Z99.81 CHRONIC HYPOXIC RESPIRATORY FAILURE, ON HOME OXYGEN THERAPY (HCC): ICD-10-CM

## 2024-09-23 DIAGNOSIS — I27.20 PULMONARY HYPERTENSION (HCC): ICD-10-CM

## 2024-09-23 DIAGNOSIS — Q21.12 PFO (PATENT FORAMEN OVALE): ICD-10-CM

## 2024-09-23 DIAGNOSIS — J96.11 CHRONIC HYPOXIC RESPIRATORY FAILURE, ON HOME OXYGEN THERAPY (HCC): ICD-10-CM

## 2024-09-23 LAB
CALCULATED OXYGEN CONTENT: 12.3
CALCULATED OXYGEN CONTENT: 12.9
CALCULATED OXYGEN CONTENT: 17.1
OXYHEMOGLOBIN: 69.4
OXYHEMOGLOBIN: 74.6
OXYHEMOGLOBIN: 93.8
TOTAL HEMOGLOBIN: 12.4
TOTAL HEMOGLOBIN: 12.7
TOTAL HEMOGLOBIN: 13.1

## 2024-09-23 PROCEDURE — 700102 HCHG RX REV CODE 250 W/ 637 OVERRIDE(OP)

## 2024-09-23 PROCEDURE — 700101 HCHG RX REV CODE 250

## 2024-09-23 PROCEDURE — 99153 MOD SED SAME PHYS/QHP EA: CPT

## 2024-09-23 PROCEDURE — 160002 HCHG RECOVERY MINUTES (STAT)

## 2024-09-23 PROCEDURE — A9270 NON-COVERED ITEM OR SERVICE: HCPCS

## 2024-09-23 PROCEDURE — 85018 HEMOGLOBIN: CPT | Performed by: INTERNAL MEDICINE

## 2024-09-23 PROCEDURE — 93460 R&L HRT ART/VENTRICLE ANGIO: CPT | Mod: 26 | Performed by: INTERNAL MEDICINE

## 2024-09-23 PROCEDURE — 700111 HCHG RX REV CODE 636 W/ 250 OVERRIDE (IP)

## 2024-09-23 PROCEDURE — 99152 MOD SED SAME PHYS/QHP 5/>YRS: CPT | Performed by: INTERNAL MEDICINE

## 2024-09-23 PROCEDURE — 700117 HCHG RX CONTRAST REV CODE 255: Performed by: INTERNAL MEDICINE

## 2024-09-23 PROCEDURE — 160036 HCHG PACU - EA ADDL 30 MINS PHASE I

## 2024-09-23 PROCEDURE — 93463 DRUG ADMIN & HEMODYNMIC MEAS: CPT | Performed by: INTERNAL MEDICINE

## 2024-09-23 PROCEDURE — 160035 HCHG PACU - 1ST 60 MINS PHASE I

## 2024-09-23 RX ORDER — ASPIRIN 81 MG/1
TABLET, CHEWABLE ORAL
Status: COMPLETED
Start: 2024-09-23 | End: 2024-09-23

## 2024-09-23 RX ORDER — SPIRONOLACTONE 50 MG/1
50 TABLET, FILM COATED ORAL DAILY
Qty: 90 TABLET | Refills: 3 | Status: SHIPPED | OUTPATIENT
Start: 2024-09-23

## 2024-09-23 RX ORDER — HEPARIN SODIUM 200 [USP'U]/100ML
INJECTION, SOLUTION INTRAVENOUS
Status: COMPLETED
Start: 2024-09-23 | End: 2024-09-23

## 2024-09-23 RX ORDER — LIDOCAINE HYDROCHLORIDE 20 MG/ML
INJECTION, SOLUTION INFILTRATION; PERINEURAL
Status: COMPLETED
Start: 2024-09-23 | End: 2024-09-23

## 2024-09-23 RX ORDER — MIDAZOLAM HYDROCHLORIDE 1 MG/ML
INJECTION INTRAMUSCULAR; INTRAVENOUS
Status: COMPLETED
Start: 2024-09-23 | End: 2024-09-23

## 2024-09-23 RX ORDER — VERAPAMIL HYDROCHLORIDE 2.5 MG/ML
INJECTION, SOLUTION INTRAVENOUS
Status: COMPLETED
Start: 2024-09-23 | End: 2024-09-23

## 2024-09-23 RX ORDER — HEPARIN SODIUM 1000 [USP'U]/ML
INJECTION, SOLUTION INTRAVENOUS; SUBCUTANEOUS
Status: COMPLETED
Start: 2024-09-23 | End: 2024-09-23

## 2024-09-23 RX ADMIN — NITROGLYCERIN 10 ML: 20 INJECTION INTRAVENOUS at 12:30

## 2024-09-23 RX ADMIN — IOHEXOL 90 ML: 350 INJECTION, SOLUTION INTRAVENOUS at 12:29

## 2024-09-23 RX ADMIN — HEPARIN SODIUM: 1000 INJECTION, SOLUTION INTRAVENOUS; SUBCUTANEOUS at 11:38

## 2024-09-23 RX ADMIN — ASPIRIN 324 MG: 81 TABLET, CHEWABLE ORAL at 11:38

## 2024-09-23 RX ADMIN — MIDAZOLAM HYDROCHLORIDE 2 MG: 1 INJECTION, SOLUTION INTRAMUSCULAR; INTRAVENOUS at 11:52

## 2024-09-23 RX ADMIN — VERAPAMIL HYDROCHLORIDE 2.5 MG: 2.5 INJECTION, SOLUTION INTRAVENOUS at 11:38

## 2024-09-23 RX ADMIN — FENTANYL CITRATE 100 MCG: 50 INJECTION, SOLUTION INTRAMUSCULAR; INTRAVENOUS at 12:19

## 2024-09-23 RX ADMIN — MIDAZOLAM HYDROCHLORIDE 2 MG: 1 INJECTION, SOLUTION INTRAMUSCULAR; INTRAVENOUS at 12:19

## 2024-09-23 RX ADMIN — LIDOCAINE HYDROCHLORIDE: 20 INJECTION, SOLUTION INFILTRATION; PERINEURAL at 11:38

## 2024-09-23 RX ADMIN — HEPARIN SODIUM 2000 UNITS: 200 INJECTION, SOLUTION INTRAVENOUS at 11:38

## 2024-09-23 ASSESSMENT — ENCOUNTER SYMPTOMS
EYES NEGATIVE: 1
HEADACHES: 0
NEUROLOGICAL NEGATIVE: 1
VOMITING: 0
ABDOMINAL PAIN: 0
CLAUDICATION: 0
PSYCHIATRIC NEGATIVE: 1
MUSCULOSKELETAL NEGATIVE: 1
LOSS OF CONSCIOUSNESS: 0
CONSTITUTIONAL NEGATIVE: 1
SHORTNESS OF BREATH: 1
BRUISES/BLEEDS EASILY: 0
FEVER: 0
ORTHOPNEA: 0
COUGH: 0
DIZZINESS: 0
WEAKNESS: 0
NAUSEA: 0
PALPITATIONS: 0
PND: 0
GASTROINTESTINAL NEGATIVE: 1
CHILLS: 0
BLURRED VISION: 0
DOUBLE VISION: 0
MYALGIAS: 0

## 2024-09-23 ASSESSMENT — FIBROSIS 4 INDEX: FIB4 SCORE: 3.41

## 2024-09-23 ASSESSMENT — PAIN DESCRIPTION - PAIN TYPE: TYPE: SURGICAL PAIN

## 2024-09-23 NOTE — PROGRESS NOTES
PPU: RECEIVED PT AT 1249, PT TR BAND TO RIGHT RADIOAL AND COBANE TO RIGHT AC CDI, NO SWELLING, NUMBNESS OR TINGLING, PT ON 3L NC BASELINE, BELONGING AT BEDSIDE AT 1250, CALLED AND UPDATED DAUGHTER, PT EATING AND DRINKING BY 1330, REPORT GIVEN TO DENI CARTWRIGHT AT 1244, NO AIR REMOVED AT TR BAND AT THAT TIME.

## 2024-09-23 NOTE — OR NURSING
@1345 Right radial site clean dry intact and soft. 2mL of air released from TR band.    @1400 Right radial site clean dry intact and soft. 2mL of air released from TR band.

## 2024-09-23 NOTE — PROCEDURES
Cardiac Catheterization Laboratory Procedure Note    DATE: 9/23/2024    : Ge Otoole MD, MPH    PROCEDURES PERFORMED:  Right heart catheterization  Left heart catheterization  Coronary angiography  Nitric oxide administration  Moderate conscious sedation    INDICATIONS:  Pulmonary hypertension, chronic hypoxic respiratory failure    CONSENT:  The complete alternatives, risks, and benefits of the procedure were explained to the patient. Informed consent was obtained prior to the procedure.    MEDICATIONS:  Lidocaine  Fentanyl  Midazolam  Nitroglycerin  Verapamil  Heparin    MODERATE CONSCIOUS SEDATION:  I personally supervised the administration of moderate conscious sedation by the nursing staff for 43 minutes.  Start time: 11:44 AM  End time: 12:27 PM    CONTRAST: Omnipaque 90 cc    RADIATION DOSE (Air Kerma): 197 mGy    FLUOROSCOPY TIME: 12.1 minutes    ACCESS:  6-Danish Glidesheath in the right brachial vein  6-Danish Glidesheath in the right radial artery    ESTIMATED BLOOD LOSS: <10 cc    COMPLICATIONS: None    PROCEDURE IN DETAIL:     The patient was brought to the cardiac catheterization laboratory in the fasting state. The skin over the right antecubital fossa was prepped and draped in the usual sterile fashion. Right brachial vein access was obtained after exchanging the pre-existing IV with the Glidesheath.  A 6-Danish balloon-tipped pulmonary artery catheter was then advanced into the right subclavian vein where the balloon was inflated.  The catheter was then advanced into the right atrium, right ventricle, pulmonary artery, and wedge position with sequential pressures measured. The balloon was deflated and pulmonary artery saturation was obtained for estimation of cardiac output by the Haroldo method. Cardiac output was then estimated using the thermodilution method. Following completion of right heart catheterization, we proceeded with the coronary angiogram and left heart  catheterization.    Following completion of right heart catheterization, lidocaine infiltration was used to anesthetize the tissue over the right radial artery. Using the micropuncture technique, a 6-Maltese Glidesheath was inserted in the right radial artery. A 5 Maltese [place] diagnostic catheter was then advanced over a standard J-wire into the left ventricular cavity where it was gently aspirated, flushed, and then withdrawn across the aortic valve with sequential pressures measured. This catheter was then used to engage the ostium of the left coronary artery and cineangiograms were obtained in multiple projections for complete evaluation of the left coronary system. This catheter was then exchanged over J-wire to a 6 Maltese 3 DRC diagnostic catheter which was used to engage the ostium of the right coronary artery and and cineangiograms were obtained in multiple projections for complete evaluation of the right coronary system.  Of note, I was unable to engage the RCA using JR4, AR-2 or MPA2 6 Fr diagnostic catheters.      Following completion of coronary angiography, all wires, catheters, and sheaths were removed.  A TR band was placed over the right wrist using the patent hemostasis technique.  Manual pressure was applied to the right brachial vein access site for hemostasis, then wrapped with Coban    RHC HEMODYNAMICS on 6L oxygen via NC:   Significant respiratory variation was not present  Mean arterial pressure: 75 mmHg  Right atrial pressure: 17 mmHg  Right ventricular pressure: 64/20 mmHg  Pulmonary artery pressure: 75/27/42 mmHg  Mean end-expiratory pulmonary capillary wedge pressure: 23 mmHg  Transpulmonary gradient: 19 mmHg  Pulmonary artery saturation: 69.4%  Systemic arterial saturation: 93.8%  Haroldo cardiac output: 5.18 L/min  Haroldo cardiac index: 2.7 L/min/m2  Thermodilution cardiac output: 5.2 L/min  Thermodilution cardiac index: 2.74 L/min/m2  Pulmonary vascular resistance: ~ 3.6  Wood units  Systemic  Vascular resistance: ~ 900 dyne s/cm5  Cardiac Power Output () = mean arterial pressure × CO/451 = 0.9 (Favorable > 0.6 W)  Holley = PA pulse pressure / RA pressure = 2.8 (Favorable > 1)    5 min of NO administration at 80ppm with 10L oxygen:  PA 65/28/42 mmHg  PCWP 25 mmHg  PA sat 74.6%, ao sat 93.8%  CO Haroldo 6.58  CI Haroldo 3.43    [facility] HEMODYNAMICS:  Aortic pressure: 101/59/75 mmHg  LVEDP: 20 mmHg  No significant transaortic gradient on pullback    CORONARY ANGIOGRAPHY:  The left main coronary artery: Large-caliber vessel that trifurcates to LAD, ramus intermedius and left circumflex  Ramus intermedius: Small in caliber   The left anterior descending coronary artery: Large-caliber transapical vessel.  Gives rise to a large second diagonal branch.  Small D1.  There is 30-40% mid LAD stenosis around the takeoff of the second diagonal branch.  There is also mid LAD myocardial bridge  The left circumflex coronary artery: Large-caliber vessel with mild CAD, gives rise to a large OM  The right coronary artery:  : Large-caliber dominant vessel with mild CAD    IMPRESSION:  1.  Elevated right heart filling pressures: CVP 17mmHg with favorable Holley ~ 2.8  2.  Elevated left heart filling pressures: LVEDP ~ 20mmHg  3.  Mixed pre and postcapillary pulmonary hypertension: PASP 75mmHg, mean PA 42mmHg, PVR 3-5 OQUENDO; without significant decrease in PA pressures after nitric oxide administration.   4.  Normal resting cardiac output by assumed Haroldo and TD ~ 5.2L/min.  Cardiac output increased after nitric oxide administration with mild increase in PCWP  5.  Favorable  and normal SVR  6.  Mild epicardial CAD, mid LAD myocardial bridge, dominant RCA      RECOMMENDATIONS:  Ongoing care per cardiology and pulmonary teams  For optimizing his HFpEF, recommend switching his Lasix/HCTZ/potassium to torsemide 40 mg daily with spironolactone 50 mg and will arrange follow-up in clinic for ongoing medical optimization.  He admits that he has  been using between 40-60mg of lasix daily instead of just 20mg daily as prescribed.    NOTIFICATION:  The patient's daughter -Kaylee- was called and notified of the results.    Outpatient pulmonary physician - Dr. Garcia - was notified.    Ge Otoole MD, MPH [facility]  Interventional Cardiologist  [facility]   of Clinical Internal Medicine - [facility]

## 2024-09-23 NOTE — DISCHARGE INSTRUCTIONS
Stop furosemide  Stop potassium  Stop hydrochlorothiazide  Start torsemide 40 mg in the morning  Start spironolactone 50 mg in the morning  POST ANGIOGRAM  General Care Instructions  Maintain a bandage over the incision site for 24 hours.  It's normal to find a small bruise or dime-sized lump at the insertion site. This should disappear within a few weeks.  Do not apply lotions or powders to the site.  Do not immerse the catheter insertion site in water (bathtub/swimming) for five days. It is ok to shower 24 hours after the procedure.  If the doctor inserted the catheter in your arm:  For 3 days, DO NOT lift anything heavier than 10 pounds (approximately a gallon of milk). DO NOT do any heavy pushing, pulling, or twisting.    Medications  If your current medications need to be changed, you will be provided with an updated list of your medications prior to discharge.  If you take warfarin (Coumadin), resume taking your usual dose the evening after the procedure.  DO NOT STOP taking prescribed blood thinning (anti-platelet) medications unless instructed by your cardiologist.  These medications include:  Aspirin, Clopidogrel (Plavix), Ticagrelor (Brilinta), or Prasugrel (Effient)   If you take one of the following anticoagulants, RESUME 24 HOURS after your procedure:  Apixiban (Eliquis), Rivaroxaban (Xarelto), Dabigatran (Pradaxa), Edoxaban (Savaysa)  If you take metformin (Glucophage), RESUME 48 HOURS after your procedure.    When to call your healthcare provider  Call your cardiologist right away at [phone] if you have any of the following:   Problems/Concerns taking any of your prescribed heart medicines.   The insertion site has increasing pain, swelling, redness, bleeding, or drainage.   Your arm or leg below where the insertion site changes color, is cool, or is numb.   You have chest pain or shortness of breath that does not go away with rest.   Your pulse feels irregular -- very slow (less than 60  beats/minute) or very fast (over 100 beats/minute).   You have dizziness, fainting, or you are very tired.   You are coughing up blood or yellow or green mucus.   You have chills or a fever over 101°F (38.3°C).    If there is bleeding at the catheter insertion site, apply pressure for 10 minutes.  If bleeding persists, call 911, and continue to hold pressure until advanced medical support arrives.

## 2024-09-24 ENCOUNTER — TELEPHONE (OUTPATIENT)
Dept: CARDIOLOGY | Facility: MEDICAL CENTER | Age: 84
End: 2024-09-24
Payer: MEDICARE

## 2024-09-24 DIAGNOSIS — I48.19 PERSISTENT ATRIAL FIBRILLATION (HCC): ICD-10-CM

## 2024-09-24 DIAGNOSIS — I27.20 PULMONARY HYPERTENSION (HCC): ICD-10-CM

## 2024-09-24 DIAGNOSIS — J84.9 ILD (INTERSTITIAL LUNG DISEASE) (HCC): ICD-10-CM

## 2024-09-24 RX ORDER — TORSEMIDE 20 MG/1
40 TABLET ORAL DAILY
Qty: 180 TABLET | Refills: 3 | Status: SHIPPED | OUTPATIENT
Start: 2024-09-24

## 2024-09-24 NOTE — TELEPHONE ENCOUNTER
Phone Number Called: [phone]     Call outcome: Spoke to patient regarding message below.    Pt is feeling well. He understands the medication updates. He states that the pharmacy does not carry torsemide 40mg, they only carry 20mg and need the order updated.     Called pharmacy and verified same.     Torsemide order updated from one 40mg tablet daily to two 20mg tablet daily.     To AL as JJ. Thank you!

## 2024-09-24 NOTE — TELEPHONE ENCOUNTER
----- Message from Physician Ge Otoole M.D. sent at 9/24/2024  8:03 AM PDT -----  Regarding: follow up  Team: can u plz add him to my clinic schedule sometime early November ?    Erica: Can lilian plben check in and ensaure he knows of the med changes we did yesterday after cath (stop lasix/potassium/HCTZ) and start [place] 50 and Torsemide 40.    Thank you!

## 2024-11-19 ENCOUNTER — OFFICE VISIT (OUTPATIENT)
Dept: CARDIOLOGY | Facility: MEDICAL CENTER | Age: 84
End: 2024-11-19
Attending: INTERNAL MEDICINE
Payer: MEDICARE

## 2024-11-19 VITALS
BODY MASS INDEX: 25.61 KG/M2 | OXYGEN SATURATION: 92 % | HEIGHT: 68 IN | DIASTOLIC BLOOD PRESSURE: 60 MMHG | RESPIRATION RATE: 16 BRPM | SYSTOLIC BLOOD PRESSURE: 122 MMHG | WEIGHT: 169 LBS | HEART RATE: 71 BPM

## 2024-11-19 DIAGNOSIS — J84.9 ILD (INTERSTITIAL LUNG DISEASE) (HCC): ICD-10-CM

## 2024-11-19 DIAGNOSIS — Z99.81 CHRONIC HYPOXIC RESPIRATORY FAILURE, ON HOME OXYGEN THERAPY (HCC): ICD-10-CM

## 2024-11-19 DIAGNOSIS — I50.32 CHRONIC HEART FAILURE WITH PRESERVED EJECTION FRACTION (HFPEF) (HCC): ICD-10-CM

## 2024-11-19 DIAGNOSIS — J96.11 CHRONIC HYPOXIC RESPIRATORY FAILURE, ON HOME OXYGEN THERAPY (HCC): ICD-10-CM

## 2024-11-19 DIAGNOSIS — I25.10 ATHEROSCLEROSIS OF NATIVE CORONARY ARTERY OF NATIVE HEART WITHOUT ANGINA PECTORIS: ICD-10-CM

## 2024-11-19 DIAGNOSIS — I10 BENIGN ESSENTIAL HYPERTENSION: ICD-10-CM

## 2024-11-19 DIAGNOSIS — I48.19 PERSISTENT ATRIAL FIBRILLATION (HCC): ICD-10-CM

## 2024-11-19 DIAGNOSIS — E78.5 HYPERLIPIDEMIA LDL GOAL <100: ICD-10-CM

## 2024-11-19 DIAGNOSIS — I27.20 PULMONARY HYPERTENSION (HCC): ICD-10-CM

## 2024-11-19 DIAGNOSIS — I27.20 PULMONARY HYPERTENSION (HCC): Primary | ICD-10-CM

## 2024-11-19 DIAGNOSIS — Z79.899 ON POTASSIUM WASTING DIURETIC THERAPY: ICD-10-CM

## 2024-11-19 PROCEDURE — 99212 OFFICE O/P EST SF 10 MIN: CPT | Performed by: INTERNAL MEDICINE

## 2024-11-19 PROCEDURE — 3078F DIAST BP <80 MM HG: CPT | Performed by: INTERNAL MEDICINE

## 2024-11-19 PROCEDURE — 99214 OFFICE O/P EST MOD 30 MIN: CPT | Performed by: INTERNAL MEDICINE

## 2024-11-19 PROCEDURE — 3074F SYST BP LT 130 MM HG: CPT | Performed by: INTERNAL MEDICINE

## 2024-11-19 PROCEDURE — G2211 COMPLEX E/M VISIT ADD ON: HCPCS | Performed by: INTERNAL MEDICINE

## 2024-11-19 RX ORDER — LOSARTAN POTASSIUM 100 MG/1
100 TABLET ORAL EVERY MORNING
Qty: 90 TABLET | Refills: 3 | Status: SHIPPED | OUTPATIENT
Start: 2024-11-19

## 2024-11-19 RX ORDER — SIMVASTATIN 40 MG
40 TABLET ORAL NIGHTLY
Qty: 100 TABLET | Refills: 3 | Status: SHIPPED | OUTPATIENT
Start: 2024-11-19

## 2024-11-19 ASSESSMENT — FIBROSIS 4 INDEX: FIB4 SCORE: 3.45

## 2024-11-19 NOTE — PROGRESS NOTES
CARDIOLOGY established PATIENT:    PCP: ERICK Jane    1. Pulmonary hypertension (HCC)    2. Persistent atrial fibrillation (HCC)    3. ILD (interstitial lung disease) (HCC)    4. Chronic hypoxic respiratory failure, on home oxygen therapy (HCC)    5. Hyperlipidemia LDL goal <100    6. Benign essential hypertension    7. Chronic heart failure with preserved ejection fraction (HFpEF) (HCC)    8. Atherosclerosis of native coronary artery of native heart without angina pectoris    9. On potassium wasting diuretic therapy        Pablo Kraus is here for follow-up for chronic HFpEF, PAH, pre and postcapillary pulmonary hypertension comanaged with pulmonary medicine, CAD, persistent A-fib on chronic anticoagulation    Chief Complaint   Patient presents with    Atrial Fibrillation    Hypertension     F/V Dx: Benign essential hypertension    Hyperlipidemia     F/V Dx: Hyperlipidemia LDL goal <100          History: Pablo Kraus is a 84 y.o. male with history of PAH/chronic hypersensitivity pneumonitis managed by Dr. Garcia, hypertension, permanent A-fib on chronic anticoagulation, chronic HFpEF, PFO, dyslipidemia, mild CAD /nonobstructive 9/2024, chronic hypoxic respiratory failure on oxygen is here for follow-up    Established care in my clinic 8/2024: Arrange right and left heart catheterization that were performed 9/2024.    9/2024: After heart cath switched his Lasix/HCTZ/potassium to torsemide 40 mg and spironolactone 50 mg (then he was using between 40-60 mg of Lasix daily)    Overall reports feeling well and stable.  Denies any chest pain, syncope, presyncope, palpitations.  Stable dyspnea exertion.  Improved lower extremity edema    Social:  Quit smoking 1984  Rare alcohol  2 daughters, 7 great grandkids   since Summer 2024 - cancer    RHC/LHC/CA 9/23/24: Personally performed  1.  Elevated right heart filling pressures: CVP 17mmHg with favorable Holley ~ 2.8  2.  Elevated left heart  filling pressures: LVEDP ~ 20mmHg  3.  Mixed pre and postcapillary pulmonary hypertension: PASP 75mmHg, mean PA 42mmHg, PVR 3-5 OQUENDO; without significant decrease in PA pressures after nitric oxide administration.   4.  Normal resting cardiac output by assumed Haroldo and TD ~ 5.2L/min.  Cardiac output increased after nitric oxide administration with mild increase in PCWP  5.  Favorable  and normal SVR  6.  Mild epicardial CAD, mid LAD myocardial bridge, dominant RCA    - right heart catheterization on 4/5/21 showed PA 47/18 (mean 31), PCW 15, PVR 3.5 OQUENDO (TdCO), TPG 16, TdCI 2.27. 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    - echocardiogram from [facility] on 10/28/21 showed LVEF 60%, concentric LVH, and moderate diastolic dysfunction, RV normal size and function, RVSP 51 mmhg, severe biatrial dilation, sclerotic trileaflet aortic valve with mild aortic stenosis and aortic regurgitation, mild tricuspid regurgitation, mild mitral regurgitation, no pericardial effusion.     - echo on 7/27/22 showed normal LV size and function, EF 64%, mild concentric LVH, biatrial enlargement, moderate pulmonic regurgitation, mild-to-moderate aortic regurgitation, in atrial fibrillation with controlled ventricular rate. RVSP 38.2    - echo 2/14/23 showed normal LVEF 65%, RV mildly dilated, right atrium severely dilated, PASP elevated 40-45, mild AR, no pericardial effusion. Normal IVC. Atrial fibrillation.       NYHA Class: II  I: no symptoms with activity. Normal  II. Mild symptoms with normal physical activity and comfortable at rest.  III: Moderate symptoms with less than normal physical activity. Only comfortable at rest  IV: Severe symptoms with symptoms of heart failure, even at rest.      PE:  BP  "122/60 (BP Location: Left arm, Patient Position: Sitting, BP Cuff Size: Adult)   Pulse 71   Resp 16   Ht 1.727 m (5' 8\")   Wt 76.7 kg (169 lb)   SpO2 92%   BMI 25.70 kg/m²     Gen: no acute distress  HEENT: Symmetric face.  NECK: + JVD.   CARDIAC: Regular, Normal S1, S2, +systolic murmur  VASCULATURE: carotids are normal bilaterally without bruit  RESP: Mild decreased air entry without wheezing. Fine crackles  ABD: Soft, non-tender, non-distended  EXT: Compression socks are on, ~1+ edema felt under the socks  SKIN: Warm and dry  NEURO: No gross deficits  PSYCH: Appropriate affect, participates in conversation    The ASCVD Risk score (Nelida DK, et al., 2019) failed to calculate.    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    Cataract 09/20/2024    IOL    Hiatus hernia syndrome     6-7 years ago    High cholesterol     15 years ago    Hypertension     Cozaar    Malignant neoplasm of prostate (HCC) 08/18/2012    Oxygen dependent 09/20/2024    3 Liters continuous    Pneumonia     60 years ago    Pneumonitis, hypersensitivity, sheela (HCC) 09/28/2016    Shortness of breath 09/20/2024    upon exertion     Past Surgical History:   Procedure Laterality Date    [place] COLONOSCOPY,DIAGNOSTIC N/A 04/27/2021    Procedure: COLONOSCOPY;  Surgeon: Lalit Negron M.D.;  Location: SURGERY SAME DAY [facility];  Service: Gastroenterology    OTHER CARDIAC SURGERY  2016    Ablation    APPENDECTOMY      CATARACT EXTRACTION WITH IOL      PROSTATECTOMY, RADICAL RETRO      TONSILLECTOMY       No Known Allergies  Outpatient Encounter Medications as of 11/19/2024   Medication Sig Dispense Refill    losartan (COZAAR) 100 MG Tab Take 1 Tablet by mouth every morning. 90 Tablet 3    simvastatin (ZOCOR) 40 MG Tab Take 1 Tablet by mouth every " evening. 100 Tablet 3    apixaban (ELIQUIS) 5mg Tab Take 1 Tablet by mouth 2 times a day. 180 Tablet 3    torsemide (DEMADEX) 20 MG Tab Take 2 Tablets by mouth every day. 180 Tablet 3    spironolactone (ALDACTONE) 50 MG Tab Take 1 Tablet by mouth every day. 90 Tablet 3    Pirfenidone 267 MG Tab Take 3 Tablets by mouth in the morning, at noon, and at bedtime. (Patient taking differently: Take 3 Tablets by mouth in the morning, at noon, and at bedtime. Takes with food) 270 Tablet 3    Treprostinil (TYVASO) 0.6 MG/ML Solution 15 inhalations 4 times daily 2.9 mL 12    vitamin e (VITAMIN E) 400 UNIT Cap Take 180 mg by mouth every day.      Home Care Oxygen Inhale continuous. 3 LNC 24 hours day      acetaminophen (TYLENOL) 325 MG Tab Take 325 mg by mouth every evening.      [DISCONTINUED] simvastatin (ZOCOR) 40 MG Tab Take 40 mg by mouth every evening.      [DISCONTINUED] apixaban (ELIQUIS) 5mg Tab Take 5 mg by mouth 2 times a day.      [DISCONTINUED] losartan (COZAAR) 100 MG Tab Take 100 mg by mouth every morning.       No facility-administered encounter medications on file as of 2024.     Social History     Socioeconomic History    Marital status:      Spouse name: Not on file    Number of children: Not on file    Years of education: Not on file    Highest education level: Associate degree: academic program   Occupational History    Not on file   Tobacco Use    Smoking status: Former     Current packs/day: 0.00     Average packs/day: 1 pack/day for 35.0 years (35.0 ttl pk-yrs)     Types: Cigarettes, Cigars     Start date: 1950     Quit date: 1985     Years since quittin.8     Passive exposure: Past    Smokeless tobacco: Never    Tobacco comments:     Quit smoking twice during 34 years; final was    Vaping Use    Vaping status: Never Used   Substance and Sexual Activity    Alcohol use: Not Currently    Drug use: Never    Sexual activity: Not on file   Other Topics Concern    Not on file    Social History Narrative    Not on file     Social Drivers of Health     Financial Resource Strain: Low Risk  (3/27/2023)    [facility]    Overall Financial Resource Strain (CARDIA)     Difficulty of Paying Living Expenses: Not hard at all   Food Insecurity: No Food Insecurity (3/27/2023)    Received from [facility]    Hunger Vital Sign     Worried About Running Out of Food in the Last Year: Never true     Ran Out of Food in the Last Year: Never true   Transportation Needs: No Transportation Needs (3/27/2023)    Received from [facility]    PRAPARE - Transportation     Lack of Transportation (Medical): No     Lack of Transportation (Non-Medical): No   Physical Activity: Patient Declined (3/27/2023)    Received from [facility]    Exercise Vital Sign     Days of Exercise per Week: Patient declined     Minutes of Exercise per Session: Patient declined   Stress: Patient Declined (3/27/2023)    [facility]    [employer] - Occupational Stress Questionnaire     Feeling of Stress : Patient declined   Social Connections: Patient Declined (3/27/2023)    Received from [facility]    Social Connection and Isolation Panel [NHANES]     Frequency of Communication with Friends and Family: Patient declined     Frequency of Social Gatherings with Friends and Family: Patient declined     Attends Confucianist Services: Patient declined     Active Member of Clubs or Organizations: Patient declined     Attends Club or Organization Meetings: Patient declined     Marital Status: Patient declined   Intimate Partner Violence: Patient Declined (3/27/2023)    Received from [facility]    Humiliation, Afraid, Rape, and Kick  "questionnaire     Fear of Current or Ex-Partner: Patient declined     Emotionally Abused: Patient declined     Physically Abused: Patient declined     Sexually Abused: Patient declined   Housing Stability: Unknown (3/27/2023)    [facility]    Housing Stability Vital Sign     Unable to Pay for Housing in the Last Year: No     Number of Places Lived in the Last Year: Not on file     Unstable Housing in the Last Year: Not on file     Family History   Problem Relation Age of Onset    Hypertension Mother     Heart Disease Mother         CABG         Studies    No results found for: \"TSHULTRASEN\"   No results found for: \"FREET4\"   No results found for: \"HBA1C\"  No results found for: \"CHOLSTRLTOT\", \"LDL\", \"HDL\", \"TRIGLYCERIDE\"    Lab Results   Component Value Date/Time    SODIUM 138 09/20/2024 03:07 PM    POTASSIUM 4.0 09/20/2024 03:07 PM    CHLORIDE 97 09/20/2024 03:07 PM    CO2 33 09/20/2024 03:07 PM    GLUCOSE 142 (H) 09/20/2024 03:07 PM    BUN 21 09/20/2024 03:07 PM    CREATININE 0.97 09/20/2024 03:07 PM     Lab Results   Component Value Date/Time    ALKPHOSPHAT 50 09/20/2024 03:07 PM    ASTSGOT 30 09/20/2024 03:07 PM    ALTSGPT 16 09/20/2024 03:07 PM    TBILIRUBIN 0.7 09/20/2024 03:07 PM        Echocardiogram:  No results found for this or any previous visit.      Assessment and Recommendations:    Problem List Items Addressed This Visit          Cardiology Medicine Problems    Atrial fibrillation (HCC)    Relevant Medications    losartan (COZAAR) 100 MG Tab    simvastatin (ZOCOR) 40 MG Tab    apixaban (ELIQUIS) 5mg Tab    Benign essential hypertension    Relevant Medications    losartan (COZAAR) 100 MG Tab    simvastatin (ZOCOR) 40 MG Tab    apixaban (ELIQUIS) 5mg Tab    Hyperlipidemia LDL goal <100    Relevant Medications    losartan (COZAAR) 100 MG Tab    simvastatin (ZOCOR) 40 MG Tab    apixaban (ELIQUIS) 5mg Tab    Chronic heart failure with preserved " ejection fraction (HFpEF) (HCC)    Relevant Medications    losartan (COZAAR) 100 MG Tab    simvastatin (ZOCOR) 40 MG Tab    apixaban (ELIQUIS) 5mg Tab    Other Relevant Orders    proBrain Natriuretic Peptide, NT       Other    ILD (interstitial lung disease) (HCC)    Pulmonary hypertension (HCC) - Primary    Relevant Medications    losartan (COZAAR) 100 MG Tab    simvastatin (ZOCOR) 40 MG Tab    apixaban (ELIQUIS) 5mg Tab    Chronic hypoxic respiratory failure, on home oxygen therapy (HCC)    Atherosclerosis of native coronary artery of native heart without angina pectoris    Relevant Medications    losartan (COZAAR) 100 MG Tab    simvastatin (ZOCOR) 40 MG Tab    apixaban (ELIQUIS) 5mg Tab    On potassium wasting diuretic therapy    Relevant Orders    Basic Metabolic Panel     Fortunately, Mr. Kraus is stable from a chronic complex cardiopulmonary standpoint, PH comanaged with the pulmonary team.    Interval improvement after diuretic adjustments, currently on torsemide 40 mg and spironolactone 50 mg in the morning.  Off Lasix and HCTZ    Blood pressure normal and at goal    Ordered ordered BMP and proBNP to follow-up given several high-risk medications specially after recent changes to his diuretic regimen.    Continue simvastatin, will not plan to change at this age with mild CAD.    Thank you for the opportunity to be involved in Pablo Kraus 's  complex cardiovascular care; and please reach out with any questions or concerns.     () Today's E/M visit is associated with medical care services that serve as the continuing focal point for all needed health care services and/or with medical care services that  are part of ongoing cardiac care related to a patient's single, serious condition, or a complex condition: This includes  furnishing services to patients on an ongoing basis that result in care that is personalized  to the patient. The services result in a comprehensive, longitudinal, and continuous   relationship with the patient and involve delivery of team-based care that is accessible, coordinated with other practitioners and providers, and integrated with the broader health  care landscape.     Return in about 5 months (around 4/19/2025).    Ge Otoole MD, MPH [facility]  Interventional Cardiologist  [facility]   of Clinical Internal Medicine - [facility] MARIA ESTHER    ~ Portions of this note were completed using voice recognition software (Dragon Naturally speaking software) . Occasional transcription errors may have escaped proof reading. I have made every reasonable attempt to correct obvious errors, but I expect that there are errors of grammar and possibly content that I did not discover before finalizing the note. ~

## 2024-11-19 NOTE — TELEPHONE ENCOUNTER
Patient/caller: Pablo Kraus    Topic/Issue: Refill    Callback Number: [phone]    Patient  came in today needing a refill on Esbriet (Pirfenidone) his recent prescription has  and needs a new order faxed over. Please let patient know when order has been faxed.    Fax number [phone]

## 2024-11-20 RX ORDER — PIRFENIDONE 267 MG/1
3 TABLET, FILM COATED ORAL 3 TIMES DAILY
Qty: 270 TABLET | Refills: 3 | Status: SHIPPED | OUTPATIENT
Start: 2024-11-20

## 2024-11-21 ENCOUNTER — HOSPITAL ENCOUNTER (OUTPATIENT)
Dept: LAB | Facility: MEDICAL CENTER | Age: 84
End: 2024-11-21
Attending: INTERNAL MEDICINE
Payer: MEDICARE

## 2024-11-21 DIAGNOSIS — I50.32 CHRONIC HEART FAILURE WITH PRESERVED EJECTION FRACTION (HFPEF) (HCC): ICD-10-CM

## 2024-11-21 DIAGNOSIS — Z79.899 ON POTASSIUM WASTING DIURETIC THERAPY: ICD-10-CM

## 2024-11-21 LAB
ANION GAP SERPL CALC-SCNC: 9 MMOL/L (ref 7–16)
BUN SERPL-MCNC: 21 MG/DL (ref 8–22)
CALCIUM SERPL-MCNC: 9.7 MG/DL (ref 8.4–10.2)
CHLORIDE SERPL-SCNC: 100 MMOL/L (ref 96–112)
CO2 SERPL-SCNC: 31 MMOL/L (ref 20–33)
CREAT SERPL-MCNC: 0.86 MG/DL (ref 0.5–1.4)
GFR SERPLBLD CREATININE-BSD FMLA CKD-EPI: 85 ML/MIN/1.73 M 2
GLUCOSE SERPL-MCNC: 135 MG/DL (ref 65–99)
NT-PROBNP SERPL IA-MCNC: 1876 PG/ML (ref 0–125)
POTASSIUM SERPL-SCNC: 3.9 MMOL/L (ref 3.6–5.5)
SODIUM SERPL-SCNC: 140 MMOL/L (ref 135–145)

## 2024-11-21 PROCEDURE — 80048 BASIC METABOLIC PNL TOTAL CA: CPT

## 2024-11-21 PROCEDURE — 36415 COLL VENOUS BLD VENIPUNCTURE: CPT

## 2024-11-21 PROCEDURE — 83880 ASSAY OF NATRIURETIC PEPTIDE: CPT

## 2024-11-25 ENCOUNTER — TELEPHONE (OUTPATIENT)
Dept: CARDIOLOGY | Facility: MEDICAL CENTER | Age: 84
End: 2024-11-25
Payer: MEDICARE

## 2024-11-25 NOTE — TELEPHONE ENCOUNTER
----- Message from Nurse Yamile MEREDITH R.N. sent at 11/25/2024  7:36 AM PST -----    ----- Message -----  From: Ge Otoole M.D.  Sent: 11/24/2024   9:35 PM PST  To: Erica Díaz R.N.    Can you please advise him to take an extra Torsemide 20mg and spironolactone 25mg on Mondays, Wednesdays and Fridays around 2pm; and continue with his daily Torsemide 40mg and judith 50mg in the AM.    Thank you Guero

## 2024-11-25 NOTE — TELEPHONE ENCOUNTER
Phone Number Called: [phone]    Call outcome: Did not leave a detailed message. Requested patient to call back.    Message: Called to inform patient of lab results and AL recommendations. Patient did not answer. Left a VM for a call back.

## 2024-11-25 NOTE — TELEPHONE ENCOUNTER
Phone Number Called: [phone]    Call outcome: Spoke to patient regarding message below.    Message: Called to inform patient of lab results and AL recommendations. Patient wrote down medication recommendations and he verbalized understanding.

## 2024-11-25 NOTE — RESULT ENCOUNTER NOTE
Can you please advise him to take an extra Torsemide 20mg and spironolactone 25mg on Mondays, Wednesdays and Fridays around 2pm; and continue with his daily Torsemide 40mg and judith 50mg in the AM.    Thank you Erica.

## 2024-12-18 ENCOUNTER — PATIENT MESSAGE (OUTPATIENT)
Dept: SLEEP MEDICINE | Facility: MEDICAL CENTER | Age: 84
End: 2024-12-18
Payer: MEDICARE

## 2024-12-19 DIAGNOSIS — I27.20 PULMONARY HYPERTENSION (HCC): ICD-10-CM

## 2025-02-04 ENCOUNTER — TELEPHONE (OUTPATIENT)
Dept: SLEEP MEDICINE | Facility: MEDICAL CENTER | Age: 85
End: 2025-02-04
Payer: MEDICARE

## 2025-02-04 DIAGNOSIS — I27.20 PULMONARY HYPERTENSION (HCC): ICD-10-CM

## 2025-02-04 NOTE — TELEPHONE ENCOUNTER
Incoming Fax: [employer]     Re:Treprostinil sodium (inhaled)    Message: Pt experiencing ad Adverse event while receiving a [employer] product.   -worsening mood due to wife passing(mood altered) 2024  -Some swelling in legs/ankles/feet (Peripheral swelling)    Outcome: scanned report and routed to provider.

## 2025-02-07 NOTE — TELEPHONE ENCOUNTER
Jennifer Bautista; Jacklyn Garcia M.D.1 hour ago (11:55 AM)     HF  I think there may be some confusion, I just spoke to the patient. He said he is not having having any bad reactions. He has been trying to get in contact because he has not been able to fill the prescription. Supposedly the form has been faxed over multiple times and has not been signed. He said he is okay to wait until March.

## 2025-02-10 ENCOUNTER — TELEPHONE (OUTPATIENT)
Dept: SLEEP MEDICINE | Facility: MEDICAL CENTER | Age: 85
End: 2025-02-10
Payer: MEDICARE

## 2025-02-11 ENCOUNTER — PATIENT MESSAGE (OUTPATIENT)
Dept: CARDIOLOGY | Facility: MEDICAL CENTER | Age: 85
End: 2025-02-11
Payer: MEDICARE

## 2025-02-11 DIAGNOSIS — I48.19 PERSISTENT ATRIAL FIBRILLATION (HCC): ICD-10-CM

## 2025-02-11 DIAGNOSIS — Z99.81 CHRONIC HYPOXIC RESPIRATORY FAILURE, ON HOME OXYGEN THERAPY (HCC): ICD-10-CM

## 2025-02-11 DIAGNOSIS — I27.20 PULMONARY HYPERTENSION (HCC): ICD-10-CM

## 2025-02-11 DIAGNOSIS — J84.9 ILD (INTERSTITIAL LUNG DISEASE) (HCC): ICD-10-CM

## 2025-02-11 DIAGNOSIS — J96.11 CHRONIC HYPOXIC RESPIRATORY FAILURE, ON HOME OXYGEN THERAPY (HCC): ICD-10-CM

## 2025-02-11 RX ORDER — SPIRONOLACTONE 50 MG/1
TABLET, FILM COATED ORAL
Qty: 96 TABLET | Refills: 1 | Status: SHIPPED | OUTPATIENT
Start: 2025-02-11

## 2025-02-11 RX ORDER — TORSEMIDE 20 MG/1
TABLET ORAL
Qty: 192 TABLET | Refills: 1 | Status: SHIPPED | OUTPATIENT
Start: 2025-02-11

## 2025-02-11 NOTE — PATIENT COMMUNICATION
Noted from tele encounter dated 11/25/24:    Ge Otoole M.D.   11/24/24  9:35 PM  Result Note  Can you please advise him to take an extra Torsemide 20mg and spironolactone 25mg on Mondays, Wednesdays and Fridays around 2pm; and continue with his daily Torsemide 40mg and judith 50mg in the AM.

## 2025-02-14 RX ORDER — PIRFENIDONE 267 MG/1
3 TABLET, FILM COATED ORAL 3 TIMES DAILY
Qty: 270 TABLET | Refills: 3 | Status: SHIPPED | OUTPATIENT
Start: 2025-02-14

## 2025-02-22 ENCOUNTER — TELEPHONE (OUTPATIENT)
Dept: SLEEP MEDICINE | Facility: MEDICAL CENTER | Age: 85
End: 2025-02-22
Payer: MEDICARE

## 2025-02-22 NOTE — TELEPHONE ENCOUNTER
Received Renewal request via [employer]  for Pirfenidone 267 MG Tab . (Quantity:270, Day Supply:30)     Insurance: [employer]   Member ID:  [id number]  BIN: [id number]  PCN: [id number]  Group: [id number]     Ran Test claim via [employer] & medication  REJECTS: PHARMACY NOT IN CONTRACT.    Will release RX to preferred pharmacy on file: [facility]    Thank you,   Jad Abebe [facility]  Pharmacy Liaison

## 2025-02-28 NOTE — TELEPHONE ENCOUNTER
Called and spoke with [facility], they did receive the update form I have faxed today. They said they have a 1 week turnaround time.   
Called and spoke with [employer] re: pt's Esbriet. The representative informed me they need a new Prescriber [employer] Form filled out. They are going to fax me a new blank Prescriber foundation form.    Received Prescriber foundation form. Formed filled out and Dr Barrera signed.  Completed form faxed/scanned.    Called [employer] to see if they received the form. They have not yet but asked for me to call at the end of the day, sometimes it can take a couple of hours to get the form in their system.   
Called and spoke with [employer] to check status.   It was APPROVED on 02/20/25. Pt can call them at [phone] to schedule delivery.    Called pt and lm asking for a return call.   Sent pt [employer] message.   
DOCUMENTATION OF PAR STATUS: (Key: [id number])  PA Case ID #: [id number]    1. Name of Medication & Dose: Esbriet 267mg tab     2. Name of Prescription Coverage Company & phone #: [employer]    3. Date Prior Auth Submitted: 02/10/25    4. What information was given to obtain insurance decision? Last 2 OV notes    5. Prior Auth Status? Pending    6. Patient Notified: yes  
FINAL PRIOR AUTHORIZATION STATUS:    1.  Name of Medication & Dose: Esbriet 267mg     2. Prior Auth Status: Denied.  Reason: see media    3. Action Taken: Pharmacy Notified: N\A Patient Notified: yes    Faxed Denial to [employer]  
Pt came in the office in regards if we have received the papwork from [employer] for his Esbriet. Received form this morning pt. Was informed.  Form was signed by Dr Aaron.     [facility] patient Foundation: prescriber foundation form for Esbriet  paperwork received from Dr Garcia requiring provider signature.     All appropriate fields completed by Medical Assistant: Yes    Paperwork  Dr Seble Aaron signed form.      Called and spoke with [employer] to inquire about form for STEP 1 portion. The representative inform, if pt has coverage, they will need the denial faxed with the application.     PA submitted. Pt informed.   
Still no response in CoverMyMeds.     Called and spoke with [employer] to check status. Asked additional questions. We will know in 24-72 hours.   REF#[id number]    Called and spoke with pt. Informed pt of this. He understood.   
minimum assist (75% patients effort)

## 2025-03-03 ENCOUNTER — HOSPITAL ENCOUNTER (OUTPATIENT)
Facility: MEDICAL CENTER | Age: 85
End: 2025-03-03
Attending: INTERNAL MEDICINE
Payer: MEDICARE

## 2025-03-03 DIAGNOSIS — J84.9 ILD (INTERSTITIAL LUNG DISEASE) (HCC): ICD-10-CM

## 2025-03-03 DIAGNOSIS — I27.20 PULMONARY HYPERTENSION (HCC): ICD-10-CM

## 2025-03-03 LAB
ALBUMIN SERPL BCP-MCNC: 4.7 G/DL (ref 3.2–4.9)
ALBUMIN/GLOB SERPL: 1.8 G/DL
ALP SERPL-CCNC: 41 U/L (ref 30–99)
ALT SERPL-CCNC: 16 U/L (ref 2–50)
ANION GAP SERPL CALC-SCNC: 12 MMOL/L (ref 7–16)
AST SERPL-CCNC: 32 U/L (ref 12–45)
BILIRUB SERPL-MCNC: 0.5 MG/DL (ref 0.1–1.5)
BUN SERPL-MCNC: 30 MG/DL (ref 8–22)
CALCIUM ALBUM COR SERPL-MCNC: 9.1 MG/DL (ref 8.5–10.5)
CALCIUM SERPL-MCNC: 9.7 MG/DL (ref 8.4–10.2)
CHLORIDE SERPL-SCNC: 99 MMOL/L (ref 96–112)
CO2 SERPL-SCNC: 28 MMOL/L (ref 20–33)
CREAT SERPL-MCNC: 1.07 MG/DL (ref 0.5–1.4)
FASTING STATUS PATIENT QL REPORTED: NORMAL
GFR SERPLBLD CREATININE-BSD FMLA CKD-EPI: 68 ML/MIN/1.73 M 2
GLOBULIN SER CALC-MCNC: 2.6 G/DL (ref 1.9–3.5)
GLUCOSE SERPL-MCNC: 111 MG/DL (ref 65–99)
NT-PROBNP SERPL IA-MCNC: 1680 PG/ML (ref 0–125)
POTASSIUM SERPL-SCNC: 4.3 MMOL/L (ref 3.6–5.5)
PROT SERPL-MCNC: 7.3 G/DL (ref 6–8.2)
SODIUM SERPL-SCNC: 139 MMOL/L (ref 135–145)

## 2025-03-03 PROCEDURE — 80053 COMPREHEN METABOLIC PANEL: CPT

## 2025-03-03 PROCEDURE — 36415 COLL VENOUS BLD VENIPUNCTURE: CPT

## 2025-03-03 PROCEDURE — 83880 ASSAY OF NATRIURETIC PEPTIDE: CPT | Mod: GA

## 2025-03-04 ENCOUNTER — TELEPHONE (OUTPATIENT)
Dept: SLEEP MEDICINE | Facility: MEDICAL CENTER | Age: 85
End: 2025-03-04
Payer: MEDICARE

## 2025-03-04 NOTE — TELEPHONE ENCOUNTER
Incoming Fax: [employer]    Re: Request for additional information re: adverse event /special situation    Message: Suspect product: [employer]  /  Esbriet       Reported adverse event: Pulm HTN, Merkel cell skin    carinoma, Chronic  right sided heart Failure    Outcome: scanned incoming fax.

## 2025-03-05 ENCOUNTER — PATIENT MESSAGE (OUTPATIENT)
Dept: SLEEP MEDICINE | Facility: MEDICAL CENTER | Age: 85
End: 2025-03-05
Payer: MEDICARE

## 2025-03-05 ENCOUNTER — TELEPHONE (OUTPATIENT)
Dept: SLEEP MEDICINE | Facility: MEDICAL CENTER | Age: 85
End: 2025-03-05
Payer: MEDICARE

## 2025-03-05 NOTE — TELEPHONE ENCOUNTER
Upcoming appt: 03/10/25 with Dr Garcia     Tests not completed: 6 min walk    Outcome: called pt and lm,asking for a return call. Sent pt a [employer] message.

## 2025-03-06 ENCOUNTER — APPOINTMENT (OUTPATIENT)
Dept: RADIOLOGY | Facility: MEDICAL CENTER | Age: 85
End: 2025-03-06
Attending: INTERNAL MEDICINE
Payer: MEDICARE

## 2025-03-06 DIAGNOSIS — C4A.9 MERKEL CELL TUMOR (HCC): ICD-10-CM

## 2025-03-06 PROCEDURE — 71046 X-RAY EXAM CHEST 2 VIEWS: CPT

## 2025-03-09 ASSESSMENT — ENCOUNTER SYMPTOMS
SHORTNESS OF BREATH: 1
COUGH: 0
FEVER: 0
HEADACHES: 0
PALPITATIONS: 0
HEARTBURN: 0
SPUTUM PRODUCTION: 0
HEMOPTYSIS: 0
WHEEZING: 0
CHILLS: 0
DIZZINESS: 0

## 2025-03-10 ENCOUNTER — NON-PROVIDER VISIT (OUTPATIENT)
Dept: SLEEP MEDICINE | Facility: MEDICAL CENTER | Age: 85
End: 2025-03-10
Attending: INTERNAL MEDICINE
Payer: MEDICARE

## 2025-03-10 VITALS — BODY MASS INDEX: 25.46 KG/M2 | WEIGHT: 168 LBS | HEIGHT: 68 IN

## 2025-03-10 VITALS
DIASTOLIC BLOOD PRESSURE: 46 MMHG | OXYGEN SATURATION: 93 % | HEART RATE: 64 BPM | SYSTOLIC BLOOD PRESSURE: 90 MMHG | WEIGHT: 167 LBS | BODY MASS INDEX: 25.31 KG/M2 | HEIGHT: 68 IN

## 2025-03-10 VITALS — HEIGHT: 68 IN | WEIGHT: 168 LBS | BODY MASS INDEX: 25.46 KG/M2

## 2025-03-10 DIAGNOSIS — R06.02 SOB (SHORTNESS OF BREATH): ICD-10-CM

## 2025-03-10 DIAGNOSIS — I27.20 PULMONARY HYPERTENSION (HCC): ICD-10-CM

## 2025-03-10 DIAGNOSIS — I50.814 RIGHT HEART FAILURE SECONDARY TO LEFT HEART FAILURE (HCC): ICD-10-CM

## 2025-03-10 DIAGNOSIS — J84.9 ILD (INTERSTITIAL LUNG DISEASE) (HCC): ICD-10-CM

## 2025-03-10 PROCEDURE — 94729 DIFFUSING CAPACITY: CPT | Mod: 26 | Performed by: INTERNAL MEDICINE

## 2025-03-10 PROCEDURE — 94060 EVALUATION OF WHEEZING: CPT | Performed by: INTERNAL MEDICINE

## 2025-03-10 PROCEDURE — 94729 DIFFUSING CAPACITY: CPT | Performed by: INTERNAL MEDICINE

## 2025-03-10 PROCEDURE — 99215 OFFICE O/P EST HI 40 MIN: CPT | Performed by: INTERNAL MEDICINE

## 2025-03-10 PROCEDURE — 94726 PLETHYSMOGRAPHY LUNG VOLUMES: CPT | Performed by: INTERNAL MEDICINE

## 2025-03-10 PROCEDURE — 94618 PULMONARY STRESS TESTING: CPT | Mod: 26 | Performed by: INTERNAL MEDICINE

## 2025-03-10 PROCEDURE — 99213 OFFICE O/P EST LOW 20 MIN: CPT | Mod: 25 | Performed by: INTERNAL MEDICINE

## 2025-03-10 PROCEDURE — 94726 PLETHYSMOGRAPHY LUNG VOLUMES: CPT | Mod: 26 | Performed by: INTERNAL MEDICINE

## 2025-03-10 PROCEDURE — 94618 PULMONARY STRESS TESTING: CPT | Performed by: INTERNAL MEDICINE

## 2025-03-10 PROCEDURE — 94060 EVALUATION OF WHEEZING: CPT | Mod: 26 | Performed by: INTERNAL MEDICINE

## 2025-03-10 ASSESSMENT — FIBROSIS 4 INDEX
FIB4 SCORE: 3.68

## 2025-03-10 ASSESSMENT — PATIENT HEALTH QUESTIONNAIRE - PHQ9: CLINICAL INTERPRETATION OF PHQ2 SCORE: 0

## 2025-03-10 NOTE — PROCEDURES
Test done on O2 at 3LPM.  Pt walked 720' (219m) or 51%.    Interpretation;  There is a drop in saturation from 100% on 3 L/min at rest to 91% on 3 L/min however oxygenation is adequate.  Distance walked 720 feet or 51% predicted.  Exercise tolerance is reduced however 3 L of supplemental oxygen appears adequate.

## 2025-03-10 NOTE — ASSESSMENT & PLAN NOTE
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  Stable since initiation of Pirfenidone  Plan:  - Continue Pirfenidone  - continue O2 at 3-6L depending on exertion  - Continue Tyvaso  - Follow up in 6 months

## 2025-03-10 NOTE — PROGRESS NOTES
Pulmonary Hypertension Clinic- Follow up    Date of Service: 3/10/25    Referring Physician: Mary Diggs A.P.*    Reason for Consult: ILD/Pulmonary Hypertension    Chief Complaint:   Chief Complaint   Patient presents with    Follow-Up     Pulmonary HTN. Last seen 08/21/24      Results     Labs 11/21/24, RHC&LHC 09/23/24, PFT 3/10/25, 6mw 03/10/25       HPI:   Pablo Kraus is a 83 y.o. male who is followed by Dr. 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  He has been assessed and managed by ILD/PH specialists at [facility] Drew Kimble and Yury Mcintosh. He is on Pirfenidone and Tyvaso (16 puffs QID) and states he tolerates these medications well.     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  He then avoided contact with birds and moved to [facility] where he reports he was asymptomatic for many years.  He does endorse about 5 times throughout his adult life where he had very high fevers (once in [place]) and was admitted to hospital for a short time but never told what his diagnosis was.  He states he was well (hiking and climbing) until about 6 years ago (2018) when he took a trip to [place] and had a flare of his lung disease which set him on a downward trajectory that he continues on now.  He is living in [facility] and would like to move his care from [place] and [facility] to [facility] for ease of getting to and from appointments.     He has had a diagnosis from Dr. Anderson, confirmed by Dr. 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  He is using 3L of oxygen today.      Per our Care Everywhere records it appears he started to see DR. 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  He  was started on Cellcept at that time.  In 12/2020 he was noted to be fluid overloaded and concern for PH triggered a referral to Dr. Mcintosh. AT that time he was on cellcept 1000mg BID.    He saw DR. Mcintosh in March 2021 and was diagnosed with PH via RHC showing precapillary PH.  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     6/21 - 2-3LPM supplemental O2, still on cellcept, on 8 breaths QID of Tyvaso.      2/22 - Titrated up to 15 puffs QID with significant symptomatic improvement.  RVSP down to 40.     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 3-4 LPM supplemental O2.     12/23 Last saw Dr. 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     5/17/24: Pablo is doing well.  Symptoms are stable off Cellcept thus far.  PFTs demonstrate a severe restriction.     8/21/24: Pablo is doing well physically, he lost his wife on July 20th and is struggling understandably with grief.  His children and grandchildren are all living in [place].  His lung function is essentially stable and he remains on Tyvaso 15 breaths QID and on perfenidone for his progressive pulmonary fibrosis. He recently established care with Dr. Otoole and is scheduled for LHC and RHC for assessment of his cardiac status and pulmonary pressures.  There was also some discussion of closing his PFO which Pablo is unsure if he would proceed with.  We can discuss this further after seeing the status of his pressures.      3/10/25: I'm very happy with how Pablo is doing today.  His PFTs are completely stable in all parameters and his 6 minute walk test indicates no increase in oxygen requirements and actually a significant decrease in O2 needs with activity from prior.  He denies any new symptoms.  He is doing well coping with the death of his  "wife in 2024, tearful when he talks about her, but staying active in [place] with friends, regularly traveling to the [place] to see his daughters and occasionally going up to his condo in [place].  We discussed his RHC which indicates a more significant left heart component than prior which is not unexpected given his age.  I did inform him that there may come a time when we have to back of the Tyvaso due to left ventricular inadequacy but not yet. He is euvolemic on exam today.      Functional status:  NYHA Class:   I: no symptoms with activity. Normal  II. Mild symptoms with normal physical activity and comfortable at rest.  III: Moderate symptoms with less than normal physical activity. Only comfortable at rest  IV: Severe symptoms with symptoms of heart failure, even at rest.    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    Cataract 09/20/2024    IOL    Hiatus hernia syndrome     6-7 years ago    High cholesterol     15 years ago    Hypertension     Cozaar    Malignant neoplasm of prostate (HCC) 08/18/2012    Oxygen dependent 09/20/2024    3 Liters continuous    Pneumonia     60 years ago    Pneumonitis, hypersensitivity, sheela (HCC) 09/28/2016    Shortness of breath 09/20/2024    upon exertion       Past Surgical History:   Procedure Laterality Date    NE COLONOSCOPY,DIAGNOSTIC N/A 04/27/2021    Procedure: COLONOSCOPY;  Surgeon: Lalit Negron M.D.;  Location: SURGERY SAME DAY [facility];  Service: Gastroenterology    OTHER CARDIAC SURGERY  2016    Ablation    " APPENDECTOMY      CATARACT EXTRACTION WITH IOL      PROSTATECTOMY, RADICAL RETRO      TONSILLECTOMY         Social History     Socioeconomic History    Marital status:      Spouse name: Not on file    Number of children: Not on file    Years of education: Not on file    Highest education level: Associate degree: academic program   Occupational History    Not on file   Tobacco Use    Smoking status: Former     Current packs/day: 0.00     Average packs/day: 1 pack/day for 35.0 years (35.0 ttl pk-yrs)     Types: Cigarettes, Cigars     Start date: 1950     Quit date: 1985     Years since quittin.1     Passive exposure: Past    Smokeless tobacco: Never    Tobacco comments:     Quit smoking twice during 34 years; final was    Vaping Use    Vaping status: Never Used   Substance and Sexual Activity    Alcohol use: Not Currently    Drug use: Never    Sexual activity: Not on file   Other Topics Concern    Not on file   Social History Narrative    Not on file     Social Drivers of Health     Financial Resource Strain: Low Risk  (3/27/2023)    [facility]    Overall Financial Resource Strain (CARDIA)     Difficulty of Paying Living Expenses: Not hard at all   Food Insecurity: No Food Insecurity (3/27/2023)    [facility]    Hunger Vital Sign     Worried About Running Out of Food in the Last Year: Never true     Ran Out of Food in the Last Year: Never true   Transportation Needs: No Transportation Needs (3/27/2023)    [facility]    PRAPARE - Transportation     Lack of Transportation (Medical): No     Lack of Transportation (Non-Medical): No   Physical Activity: Patient Declined (3/27/2023)    [facility]    Exercise Vital Sign     Days of Exercise per Week: Patient declined     Minutes of  Exercise per Session: Patient declined   Stress: Patient Declined (3/27/2023)    [facility]    [place] [facility] [employer] [facility] - Occupational Stress Questionnaire     Feeling of Stress : Patient declined   Social Connections: Patient Declined (3/27/2023)    [facility] from [facility]    Social Connection and Isolation Panel [NHANES]     Frequency of Communication with Friends and Family: Patient declined     Frequency of Social Gatherings with Friends and Family: Patient declined     Attends Confucianism Services: Patient declined     Active Member of Clubs or Organizations: Patient declined     Attends Club or Organization Meetings: Patient declined     Marital Status: Patient declined   Intimate Partner Violence: Patient Declined (3/27/2023)    Received from [facility]    Humiliation, Afraid, Rape, and Kick questionnaire     Fear of Current or Ex-Partner: Patient declined     Emotionally Abused: Patient declined     Physically Abused: Patient declined     Sexually Abused: Patient declined   Housing Stability: Unknown (3/27/2023)    [facility] from [facility], [facility]    Housing Stability Vital Sign     Unable to Pay for Housing in the Last Year: No     Number of Places Lived in the Last Year: Not on file     Unstable Housing in the Last Year: Not on file          Family History   Problem Relation Age of Onset    Hypertension Mother     Heart Disease Mother         CABG       Current Outpatient Medications on File Prior to Visit   Medication Sig Dispense Refill    Pirfenidone 267 MG Tab Take 3 Tablets by mouth in the morning, at noon, and at bedtime. 270 Tablet 3    spironolactone (ALDACTONE) 50 MG Tab Take 1 tablet by mouth every morning. Take an extra 0.5 tablet at 2pm on Monday, Wednesday, Friday. 96 Tablet 1    torsemide (DEMADEX) 20 MG Tab Take 2 tablets by mouth  "every morning. Take an extra 1 tablet at 2pm on Monday, Wednesday, Friday. 192 Tablet 1    losartan (COZAAR) 100 MG Tab Take 1 Tablet by mouth every morning. 90 Tablet 3    simvastatin (ZOCOR) 40 MG Tab Take 1 Tablet by mouth every evening. 100 Tablet 3    apixaban (ELIQUIS) 5mg Tab Take 1 Tablet by mouth 2 times a day. 180 Tablet 3    Treprostinil (TYVASO) 0.6 MG/ML Solution 15 inhalations 4 times daily 2.9 mL 12    vitamin e (VITAMIN E) 400 UNIT Cap Take 180 mg by mouth every day.      Home Care Oxygen Inhale continuous. 3 LNC 24 hours day      acetaminophen (TYLENOL) 325 MG Tab Take 325 mg by mouth every evening.       No current facility-administered medications on file prior to visit.       Allergies: Patient has no known allergies.      ROS:   Review of Systems   Constitutional:  Negative for chills, fever and malaise/fatigue.   HENT:  Negative for congestion.    Respiratory:  Positive for shortness of breath. Negative for cough, hemoptysis, sputum production and wheezing.    Cardiovascular:  Negative for chest pain, palpitations and leg swelling.   Gastrointestinal:  Negative for heartburn.   Neurological:  Negative for dizziness and headaches.   Endo/Heme/Allergies:  Positive for environmental allergies.       Vitals:  BP 90/46 (BP Location: Left arm, Patient Position: Sitting, BP Cuff Size: Adult)   Pulse 64   Ht 1.727 m (5' 8\")   Wt 75.8 kg (167 lb)   SpO2 93%     Physical Exam:  Physical Exam  Constitutional:       Appearance: Normal appearance.   HENT:      Head: Atraumatic.      Mouth/Throat:      Mouth: Mucous membranes are dry.   Cardiovascular:      Rate and Rhythm: Normal rate. Rhythm irregular.      Heart sounds: Murmur heard.   Pulmonary:      Effort: Pulmonary effort is normal. No respiratory distress.      Breath sounds: No wheezing or rales.      Comments: Crackles bilaterally at bases  Abdominal:      General: Abdomen is flat.   Musculoskeletal:         General: No swelling.   Skin:     " General: Skin is warm and dry.   Neurological:      General: No focal deficit present.      Mental Status: He is alert and oriented to person, place, and time.           Vaccinations:    Pneumovax: Complete  Covid: Vaccinated 2024  Annual Flu: Vaccinated 2024  RSV: 2023    Pertinent Studies:  Laboratory Data:  Cocci serologies negative 4/26/24      6MWT:  3/3/21: 263m on room air  8/21/24: 219m on 6LPM  3/10/25: 219m on 3LPM    PFTs as reviewed by me personally show:          PFT Trend:  FVC(%):  FEV1 (%): TLC(%):  DLCO(%)  Date:  3/10/25      1.72L(49%), 1.46L(62%), 3.29L(49%), 9.45(44%)  8/21/24      1.70L(49%), 1.39L(54%), 3.23L (48%), 9.62(43%)  5/17/24      1.64L(50%), 1.39L(59%), 3.31L(49%), 8.28(37%)  8/30/22      3.5L(53%), 2.45L(59%), 2.72L(42%), 10.05(38%)    Imaging as reviewed by me personally show:    HRCT: 11/28/23:      Pertinent Cardiac Studies:  Echo:    RHC:  4/5/21:  Right Heart Cath (4/5/2021) RA 9 PA 47/18 (31) PCW 15 CO 4.57 PVR 3.5                        POST PROCEDURAL DIAGNOSIS/CONCLUSIONS:   Pulmonary Hypertension                                             1. Borderline elevated right (RAP 9 mmHg) and left (PAWP 15 mmHg) sided filling pressures   2. Normal cardiac output(index) via both Haroldo 5.5 L/min (2.7 L/min/m2) and TDCO 4.6 L/min (2.3 L/min/m2)   3. Combined pre- and post-capillary pulmonary hypertension with mPA 31 mmHg, TPG 16 mmHg, and PVR 3.5 OQUENDO (using TDCO)   4. Brachial vein hemostasis with manual compression     9/23/24  RHC HEMODYNAMICS on 6L oxygen via NC:   Significant respiratory variation was not present  Mean arterial pressure: 75 mmHg  Right atrial pressure: 17 mmHg  Right ventricular pressure: 64/20 mmHg  Pulmonary artery pressure: 75/27/42 mmHg  Mean end-expiratory pulmonary capillary wedge pressure: 23 mmHg  Transpulmonary gradient: 19 mmHg  Pulmonary artery saturation: 69.4%  Systemic arterial saturation: 93.8%  Haroldo cardiac output: 5.18 L/min  Haroldo cardiac index:  2.7 L/min/m2  Thermodilution cardiac output: 5.2 L/min  Thermodilution cardiac index: 2.74 L/min/m2  Pulmonary vascular resistance: ~ 3.6  Wood units  Systemic Vascular resistance: ~ 900 dyne s/cm5  Cardiac Power Output () = mean arterial pressure × CO/451 = 0.9 (Favorable > 0.6 W)  Holley = PA pulse pressure / RA pressure = 2.8 (Favorable > 1)     5 min of NO administration at 80ppm with 10L oxygen:  PA 65/28/42 mmHg  PCWP 25 mmHg  PA sat 74.6%, ao sat 93.8%  CO Haroldo 6.58  CI Haroldo 3.43     [facility] HEMODYNAMICS:  Aortic pressure: 101/59/75 mmHg  LVEDP: 20 mmHg  No significant transaortic gradient on pullback     CORONARY ANGIOGRAPHY:  The left main coronary artery: Large-caliber vessel that trifurcates to LAD, ramus intermedius and left circumflex  Ramus intermedius: Small in caliber   The left anterior descending coronary artery: Large-caliber transapical vessel.  Gives rise to a large second diagonal branch.  Small D1.  There is 30-40% mid LAD stenosis around the takeoff of the second diagonal branch.  There is also mid LAD myocardial bridge  The left circumflex coronary artery: Large-caliber vessel with mild CAD, gives rise to a large OM  The right coronary artery:  : Large-caliber dominant vessel with mild CAD     IMPRESSION:  1.  Elevated right heart filling pressures: CVP 17mmHg with favorable Holley ~ 2.8  2.  Elevated left heart filling pressures: LVEDP ~ 20mmHg  3.  Mixed pre and postcapillary pulmonary hypertension: PASP 75mmHg, mean PA 42mmHg, PVR 3-5 OQUENDO; without significant decrease in PA pressures after nitric oxide administration.   4.  Normal resting cardiac output by assumed Haroldo and TD ~ 5.2L/min.  Cardiac output increased after nitric oxide administration with mild increase in PCWP  5.  Favorable  and normal SVR  6.  Mild epicardial CAD, mid LAD myocardial bridge, dominant RCA    Assessment/Plan:    Problem List Items Addressed This Visit       ILD (interstitial lung disease) (HCC)    Chronic  progressive fibrosis 2/2 chronic hypersensitivity pneumonitis from sheela antigen.  Stable since initiation of Pirfenidone  Plan:  - Continue Pirfenidone  - continue O2 at 3-6L depending on exertion  - Continue Tyvaso  - Follow up in 6 months            Pulmonary hypertension (HCC)    Patient has a history of CHP for many years 2/2 sheela exposure. His CHP was controlled well with immunosuppression with cellcept 1000mg BID and avoidance of exposures.  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  He developed signs of UIP/IPF in 2023 and was started on Pirfenidone.  He has concurrently been followed by [facility] for combined Group I and III PH, treated with Tyvaso at 15 puffs QID with good symptomatic response and Echo decreased RVSP to 40.    Plan:  - 6 MWT 6 months  - PFT in 1 year  - Completed pulmonary rehab with significant decrease in exertional oxygen needs  - Continue Tyvaso 15puffs QID   - Continue Pirfenidone 3 tabs TID (801mg TID) - This Rx must go to the  as he obtains it through a drug assistance program.  It has been repeatedly denied by his insurance despite its clear indication.   - CMP every 3 months to assess creatinine and liver function - stable 3/3  - BNP q 3 months - reordered  - Continue diuretic regimen plan per cardiology - Torsemide 40 q am with spironolactone 50mg and additional torsemide 20 with spironolactone 25mg on MWF afternoons.   - Follow up in 6 months with Timothy Bolaños PH APRN.         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     This note was generated using voice recognition software which has a chance of producing errors of grammar and possibly content.  I have made  every reasonable attempt to find and correct any obvious errors, but it should be expected that some may not be found prior to finalization of this note.    Time spent in record review prior to patient arrival, reviewing results, and in face-to-face encounter totaled 45 min, excluding any procedures if performed.      Jacklyn Garcia MD RD  Pulmonary and Critical Care Medicine  [facility]

## 2025-03-10 NOTE — ASSESSMENT & PLAN NOTE
Patient has a history of CHP for many years 2/2 sheela exposure. His CHP was controlled well with immunosuppression with cellcept 1000mg BID and avoidance of exposures.  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  He developed signs of UIP/IPF in 2023 and was started on Pirfenidone.  He has concurrently been followed by [facility] for combined Group I and III PH, treated with Tyvaso at 15 puffs QID with good symptomatic response and Echo decreased RVSP to 40.    Plan:  - 6 MWT 6 months  - PFT in 1 year  - Completed pulmonary rehab with significant decrease in exertional oxygen needs  - Continue Tyvaso 15puffs QID   - Continue Pirfenidone 3 tabs TID (801mg TID) - This Rx must go to the  as he obtains it through a drug assistance program.  It has been repeatedly denied by his insurance despite its clear indication.   - CMP every 3 months to assess creatinine and liver function - stable 3/3  - BNP q 3 months - reordered  - Continue diuretic regimen plan per cardiology - Torsemide 40 q am with spironolactone 50mg and additional torsemide 20 with spironolactone 25mg on MWF afternoons.   - Follow up in 6 months with Timothy Bolaños PH APRN.

## 2025-03-10 NOTE — PROCEDURES
Technician: JUAQUIN Diaz    Technician Comment:  Good patient effort & cooperation.  The results of this test meet the ATS/ERS standards for acceptability & reproducibility.  Test was performed on the [employer] Body Plethysmograph-Elite DX system.  Predicted values were GLI-2012 for spirometry, GLI-2020 for DLCO, ITS for Lung Volumes.  The DLCO was uncorrected for Hgb.  A bronchodilator of Albuterol HFA -2puffs via spacer administered.  DLCO performed during dilation period.    Interpretation:  Baseline spirometry shows restriction with an FEV1/FVC ratio 85 and an FVC of 1.72 L or 49% predicted.  No significant bronchodilator response.  Total lung capacity is reduced at 3.29 L or 49% predicted.  Diffusion capacity is reduced at 44% predicted.  Pulmonary function testing shows restriction with reduction in DLCO suggesting an interstitial process.  Correlate clinically and with imaging.

## 2025-03-11 ENCOUNTER — APPOINTMENT (OUTPATIENT)
Dept: SLEEP MEDICINE | Facility: MEDICAL CENTER | Age: 85
End: 2025-03-11
Attending: INTERNAL MEDICINE
Payer: MEDICARE

## 2025-04-04 ENCOUNTER — HOSPITAL ENCOUNTER (OUTPATIENT)
Facility: MEDICAL CENTER | Age: 85
End: 2025-04-04
Attending: INTERNAL MEDICINE
Payer: MEDICARE

## 2025-04-04 ENCOUNTER — OFFICE VISIT (OUTPATIENT)
Dept: CARDIOLOGY | Facility: MEDICAL CENTER | Age: 85
End: 2025-04-04
Attending: INTERNAL MEDICINE
Payer: MEDICARE

## 2025-04-04 VITALS
BODY MASS INDEX: 25.31 KG/M2 | OXYGEN SATURATION: 93 % | SYSTOLIC BLOOD PRESSURE: 108 MMHG | HEIGHT: 68 IN | HEART RATE: 75 BPM | WEIGHT: 167 LBS | RESPIRATION RATE: 20 BRPM | DIASTOLIC BLOOD PRESSURE: 60 MMHG

## 2025-04-04 DIAGNOSIS — J84.9 ILD (INTERSTITIAL LUNG DISEASE) (HCC): ICD-10-CM

## 2025-04-04 DIAGNOSIS — I27.20 PULMONARY HYPERTENSION (HCC): ICD-10-CM

## 2025-04-04 DIAGNOSIS — Q21.12 PFO (PATENT FORAMEN OVALE): ICD-10-CM

## 2025-04-04 DIAGNOSIS — Z79.899 ON POTASSIUM WASTING DIURETIC THERAPY: ICD-10-CM

## 2025-04-04 DIAGNOSIS — I50.32 CHRONIC HEART FAILURE WITH PRESERVED EJECTION FRACTION (HFPEF) (HCC): ICD-10-CM

## 2025-04-04 DIAGNOSIS — J96.11 CHRONIC HYPOXIC RESPIRATORY FAILURE, ON HOME OXYGEN THERAPY (HCC): ICD-10-CM

## 2025-04-04 DIAGNOSIS — I10 BENIGN ESSENTIAL HYPERTENSION: ICD-10-CM

## 2025-04-04 DIAGNOSIS — Z99.81 CHRONIC HYPOXIC RESPIRATORY FAILURE, ON HOME OXYGEN THERAPY (HCC): ICD-10-CM

## 2025-04-04 DIAGNOSIS — I48.19 PERSISTENT ATRIAL FIBRILLATION (HCC): ICD-10-CM

## 2025-04-04 DIAGNOSIS — I50.32 CHRONIC HEART FAILURE WITH PRESERVED EJECTION FRACTION (HFPEF) (HCC): Primary | ICD-10-CM

## 2025-04-04 DIAGNOSIS — E78.5 HYPERLIPIDEMIA LDL GOAL <100: ICD-10-CM

## 2025-04-04 LAB
BASOPHILS # BLD AUTO: 0.8 % (ref 0–1.8)
BASOPHILS # BLD: 0.05 K/UL (ref 0–0.12)
EOSINOPHIL # BLD AUTO: 0.09 K/UL (ref 0–0.51)
EOSINOPHIL NFR BLD: 1.4 % (ref 0–6.9)
ERYTHROCYTE [DISTWIDTH] IN BLOOD BY AUTOMATED COUNT: 47.5 FL (ref 35.9–50)
HCT VFR BLD AUTO: 39.9 % (ref 42–52)
HGB BLD-MCNC: 12.8 G/DL (ref 14–18)
IMM GRANULOCYTES # BLD AUTO: 0.02 K/UL (ref 0–0.11)
IMM GRANULOCYTES NFR BLD AUTO: 0.3 % (ref 0–0.9)
IRON SATN MFR SERPL: 26 % (ref 15–55)
IRON SERPL-MCNC: 71 UG/DL (ref 50–180)
LYMPHOCYTES # BLD AUTO: 0.71 K/UL (ref 1–4.8)
LYMPHOCYTES NFR BLD: 10.8 % (ref 22–41)
MCH RBC QN AUTO: 32 PG (ref 27–33)
MCHC RBC AUTO-ENTMCNC: 32.1 G/DL (ref 32.3–36.5)
MCV RBC AUTO: 99.8 FL (ref 81.4–97.8)
MONOCYTES # BLD AUTO: 0.56 K/UL (ref 0–0.85)
MONOCYTES NFR BLD AUTO: 8.5 % (ref 0–13.4)
NEUTROPHILS # BLD AUTO: 5.16 K/UL (ref 1.82–7.42)
NEUTROPHILS NFR BLD: 78.2 % (ref 44–72)
NRBC # BLD AUTO: 0 K/UL
NRBC BLD-RTO: 0 /100 WBC (ref 0–0.2)
PLATELET # BLD AUTO: 192 K/UL (ref 164–446)
PMV BLD AUTO: 9.5 FL (ref 9–12.9)
RBC # BLD AUTO: 4 M/UL (ref 4.7–6.1)
TIBC SERPL-MCNC: 278 UG/DL (ref 250–450)
UIBC SERPL-MCNC: 207 UG/DL (ref 110–370)
WBC # BLD AUTO: 6.6 K/UL (ref 4.8–10.8)

## 2025-04-04 PROCEDURE — 85025 COMPLETE CBC W/AUTO DIFF WBC: CPT

## 2025-04-04 PROCEDURE — 99214 OFFICE O/P EST MOD 30 MIN: CPT | Performed by: INTERNAL MEDICINE

## 2025-04-04 PROCEDURE — 36415 COLL VENOUS BLD VENIPUNCTURE: CPT

## 2025-04-04 PROCEDURE — 83550 IRON BINDING TEST: CPT

## 2025-04-04 PROCEDURE — 82728 ASSAY OF FERRITIN: CPT

## 2025-04-04 PROCEDURE — 99213 OFFICE O/P EST LOW 20 MIN: CPT | Performed by: INTERNAL MEDICINE

## 2025-04-04 PROCEDURE — 83540 ASSAY OF IRON: CPT

## 2025-04-04 RX ORDER — TORSEMIDE 20 MG/1
TABLET ORAL
Qty: 192 TABLET | Refills: 3 | Status: SHIPPED | OUTPATIENT
Start: 2025-04-04

## 2025-04-04 RX ORDER — SPIRONOLACTONE 50 MG/1
TABLET, FILM COATED ORAL
Qty: 96 TABLET | Refills: 3 | Status: SHIPPED | OUTPATIENT
Start: 2025-04-04

## 2025-04-04 ASSESSMENT — FIBROSIS 4 INDEX: FIB4 SCORE: 3.68

## 2025-04-04 NOTE — PATIENT INSTRUCTIONS
Iron studies and CBC labs soon  Early June CMP and proBNP labs  Refilled Torsemide and spironolactone  If you gain at least 3 lbs a day or 5lbs a week, take extra Torsemide 20mg and let me know

## 2025-04-04 NOTE — PROGRESS NOTES
CARDIOLOGY established PATIENT:    PCP: ERICK Jane    1. Chronic heart failure with preserved ejection fraction (HFpEF) (HCC)    2. Benign essential hypertension    3. Hyperlipidemia LDL goal <100    4. Pulmonary hypertension (HCC)    5. Chronic hypoxic respiratory failure, on home oxygen therapy (HCC)    6. On potassium wasting diuretic therapy    7. PFO (patent foramen ovale)    8. Persistent atrial fibrillation (HCC)    9. ILD (interstitial lung disease) (HCC)        Pablo Kraus is here for follow-up for chronic HFpEF with underlying PAH/CHB/pulmonary fibrosis.  Persistent A-fib and history of PFO.    Chief Complaint   Patient presents with    Follow-Up     DX: Pulmonary hypertension (HCC)         History:     Pablo Kraus is an 84 y.o. male with history of PAH/chronic hypersensitivity pneumonitis managed by Dr. Garcia, hypertension, permanent A-fib on chronic anticoagulation, chronic HFpEF, PFO, dyslipidemia, mild CAD /nonobstructive 9/2024, chronic hypoxic respiratory failure on oxygen is here for follow-up     Established care in my clinic 8/2024: Arrange right and left heart catheterization that were performed 9/2024.     9/2024: After heart cath switched his Lasix/HCTZ/potassium to torsemide 40 mg and spironolactone 50 mg (then he was using between 40-60 mg of Lasix daily)    Clinic 11/19/24: No medication changes    Torsemide regimen adjusted 2/2025: Torsemide 40 mg and spironolactone 50 mg daily, extra torsemide 20 mg and spironolactone 25 mg on Mondays Wednesdays and Fridays around 2 PM.      Pulmonary visit with Dr. Garcia 3/10/25: No medication changes    Overall reports feeling stable, with mild increase in dyspnea on exertion over the last several months.  Tolerable.  Stable weights.  Denies significant lower extremity edema, orthopnea, PND.  Denies any syncope or chest pains.    Social:  Quit smoking 1984  Rare alcohol  2 daughters, 7 great grandkids   since Summer  2024 - cancer    proBNP 3/3/25 1680  proBNP 11/21/24 1876    Hg 12.6 9/2024    No iron studies    CMP 3/3/25:  K 4.3, Na 139, BUN 30, Cr 1.07     RHC/LHC/CA 9/23/24: Personally performed  1.  Elevated right heart filling pressures: CVP 17mmHg with favorable Holley ~ 2.8  2.  Elevated left heart filling pressures: LVEDP ~ 20mmHg  3.  Mixed pre and postcapillary pulmonary hypertension: PASP 75mmHg, mean PA 42mmHg, PVR 3-5 OQUENDO; without significant decrease in PA pressures after nitric oxide administration.   4.  Normal resting cardiac output by assumed Haroldo and TD ~ 5.2L/min.  Cardiac output increased after nitric oxide administration with mild increase in PCWP  5.  Favorable  and normal SVR  6.  Mild epicardial CAD, mid LAD myocardial bridge, dominant RCA     - right heart catheterization on 4/5/21 showed PA 47/18 (mean 31), PCW 15, PVR 3.5 OQUENDO (TdCO), TPG 16, TdCI 2.27. 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    - echocardiogram from [place] on 10/28/21 showed LVEF 60%, concentric LVH, and moderate diastolic dysfunction, RV normal size and function, RVSP 51 mmhg, severe biatrial dilation, sclerotic trileaflet aortic valve with mild aortic stenosis and aortic regurgitation, mild tricuspid regurgitation, mild mitral regurgitation, no pericardial effusion.     - echo on 7/27/22 showed normal LV size and function, EF 64%, mild concentric LVH, biatrial enlargement, moderate pulmonic regurgitation, mild-to-moderate aortic regurgitation, in atrial fibrillation with controlled ventricular rate. RVSP 38.2    - echo 2/14/23 showed normal LVEF 65%, RV mildly dilated, right atrium severely dilated, PASP elevated 40-45, mild AR, no pericardial effusion. Normal IVC. Atrial fibrillation.  "    Functional status:       NYHA Class: II  I: no symptoms with activity. Normal  II. Mild symptoms with normal physical activity and comfortable at rest.  III: Moderate symptoms with less than normal physical activity. Only comfortable at rest  IV: Severe symptoms with symptoms of heart failure, even at rest.      PE:  /60 (BP Location: Left arm, Patient Position: Sitting, BP Cuff Size: Adult)   Pulse 75   Resp 20   Ht 1.727 m (5' 8\")   Wt 75.8 kg (167 lb)   SpO2 93%   BMI 25.39 kg/m²     Gen: well  HEENT: Symmetric face.  NECK: No JVD.   CARDIAC: Normal S1-S2, mild systolic murmur likely due to aortic valve sclerosis  VASCULATURE: carotids are normal bilaterally without bruit  RESP: Clear to auscultation bilaterally .  Mild decreased air entry  EXT: Compression socks.  No palpable edema  SKIN: Warm and dry  NEURO: No gross deficits  PSYCH: Appropriate affect, participates in conversation    The ASCVD Risk score (Nelida DK, et al., 2019) failed to calculate.    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    Cataract 09/20/2024    IOL    COPD (chronic obstructive pulmonary disease) (HCC)     Delayed emergence from general anesthesia     NO    Hard to intubate     NO    Hiatus hernia syndrome     6-7 years ago    High cholesterol     15 years ago    Hypertension     Cozaar    Malignant hyperthermia     NO    Malignant neoplasm of prostate (HCC) 08/18/2012    Oxygen dependent 09/20/2024    3 Liters continuous    Pneumonia     60 years ago    Pneumonitis, hypersensitivity, sheela (HCC) 09/28/2016    PONV (postoperative nausea and vomiting)     NO    Pseudocholinesterase deficiency     NO    Shortness of breath 09/20/2024    upon " exertion     Past Surgical History:   Procedure Laterality Date    TX COLONOSCOPY-FLEXIBLE N/A 04/27/2021    Procedure: COLONOSCOPY;  Surgeon: Lalit Negron M.D.;  Location: SURGERY SAME DAY [facility];  Service: Gastroenterology    OTHER CARDIAC SURGERY  2016    Ablation    APPENDECTOMY      CATARACT EXTRACTION WITH IOL      HERNIA REPAIR      PROSTATECTOMY, RADICAL RETRO      TONSILLECTOMY      VASECTOMY       No Known Allergies  Outpatient Encounter Medications as of 4/4/2025   Medication Sig Dispense Refill    torsemide (DEMADEX) 20 MG Tab Take 2 tablets by mouth every morning. Take an extra 1 tablet at 2pm on Monday, Wednesday, Friday. 192 Tablet 3    spironolactone (ALDACTONE) 50 MG Tab Take 1 tablet by mouth every morning. Take an extra 0.5 tablet at 2pm on Monday, Wednesday, Friday. 96 Tablet 3    Empagliflozin (JARDIANCE) 10 MG Tab tablet Take 1 Tablet by mouth every day. 100 Tablet 3    Pirfenidone 267 MG Tab Take 3 Tablets by mouth in the morning, at noon, and at bedtime. 270 Tablet 3    losartan (COZAAR) 100 MG Tab Take 1 Tablet by mouth every morning. 90 Tablet 3    simvastatin (ZOCOR) 40 MG Tab Take 1 Tablet by mouth every evening. 100 Tablet 3    apixaban (ELIQUIS) 5mg Tab Take 1 Tablet by mouth 2 times a day. 180 Tablet 3    Treprostinil (TYVASO) 0.6 MG/ML Solution 15 inhalations 4 times daily 2.9 mL 12    vitamin e (VITAMIN E) 400 UNIT Cap Take 180 mg by mouth every day.      Home Care Oxygen Inhale continuous. 3 LNC 24 hours day      acetaminophen (TYLENOL) 325 MG Tab Take 325 mg by mouth every evening.      [DISCONTINUED] spironolactone (ALDACTONE) 50 MG Tab Take 1 tablet by mouth every morning. Take an extra 0.5 tablet at 2pm on Monday, Wednesday, Friday. 96 Tablet 1    [DISCONTINUED] torsemide (DEMADEX) 20 MG Tab Take 2 tablets by mouth every morning. Take an extra 1 tablet at 2pm on Monday, Wednesday, Friday. 192 Tablet 1     No facility-administered encounter medications on file as of  2025.     Social History     Socioeconomic History    Marital status:      Spouse name: Not on file    Number of children: Not on file    Years of education: Not on file    Highest education level: Associate degree: academic program   Occupational History    Not on file   Tobacco Use    Smoking status: Former     Current packs/day: 0.00     Average packs/day: 1 pack/day for 35.0 years (35.0 ttl pk-yrs)     Types: Cigarettes, Cigars     Start date: 1950     Quit date: 1985     Years since quittin.2     Passive exposure: Past    Smokeless tobacco: Never    Tobacco comments:     Quit smoking twice during 34 years; final was    Vaping Use    Vaping status: Never Used   Substance and Sexual Activity    Alcohol use: Not Currently    Drug use: Never    Sexual activity: Not on file   Other Topics Concern    Not on file   Social History Narrative    Not on file     Social Drivers of Health     Financial Resource Strain: Low Risk  (3/27/2023)    [facility]    Overall Financial Resource Strain (CARDIA)     Difficulty of Paying Living Expenses: Not hard at all   Food Insecurity: Low Risk  (3/11/2025)    Received from [facility]    SINCERE Food Insecurity     In the last month, did you feel there was not enough money for food?: No   Transportation Needs: Low Risk  (3/11/2025)    Received from [facility]    SINCERE Transportation Needs     In the last month, have you not seen a doctor because you didn't have a way to get to the clinic or hospital?: No   Physical Activity: Patient Declined (3/27/2023)    [facility]    Exercise Vital Sign     Days of Exercise per Week: Patient declined     Minutes of Exercise per Session: Patient declined   Stress: Patient Declined (3/27/2023)    [facility]    Malaysian  "[employer] - Occupational Stress Questionnaire     Feeling of Stress : Patient declined   Social Connections: Patient Declined (3/27/2023)    [facility]    Social Connection and Isolation Panel [NHANES]     Frequency of Communication with Friends and Family: Patient declined     Frequency of Social Gatherings with Friends and Family: Patient declined     Attends Hoahaoism Services: Patient declined     Active Member of Clubs or Organizations: Patient declined     Attends Club or Organization Meetings: Patient declined     Marital Status: Patient declined   Intimate Partner Violence: Patient Declined (3/27/2023)    Received from [facility]    Humiliation, Afraid, Rape, and Kick questionnaire     Fear of Current or Ex-Partner: Patient declined     Emotionally Abused: Patient declined     Physically Abused: Patient declined     Sexually Abused: Patient declined   Housing Stability: Low Risk  (3/11/2025)    Received from [facility]    SINCERE Housing Needs     In the last month, was there a time when you were not able to pay your mortgage or rent?: No     In the last month, have you slept outside, in a shelter, in a car, or any place not meant for sleeping?: Not on file     Family History   Problem Relation Age of Onset    Hypertension Mother     Heart Disease Mother         CABG    Stroke Mother     Arthritis Mother     Cancer Father         Prostate         Studies    No results found for: \"TSHULTRASEN\"   No results found for: \"FREET4\"   No results found for: \"HBA1C\"  No results found for: \"CHOLSTRLTOT\", \"LDL\", \"HDL\", \"TRIGLYCERIDE\"    Lab Results   Component Value Date/Time    SODIUM 139 03/03/2025 08:21 AM    POTASSIUM 4.3 03/03/2025 08:21 AM    CHLORIDE 99 03/03/2025 08:21 AM    CO2 28 03/03/2025 08:21 AM    GLUCOSE 111 (H) 03/03/2025 08:21 AM    BUN 30 (H) 03/03/2025 08:21 AM    CREATININE 1.07 03/03/2025 " 08:21 AM     Lab Results   Component Value Date/Time    ALKPHOSPHAT 41 03/03/2025 08:21 AM    ASTSGOT 32 03/03/2025 08:21 AM    ALTSGPT 16 03/03/2025 08:21 AM    TBILIRUBIN 0.5 03/03/2025 08:21 AM        Echocardiogram:  No results found for this or any previous visit.    Assessment and Recommendations:    Problem List Items Addressed This Visit          Cardiology Medicine Problems    Atrial fibrillation (HCC)    Relevant Medications    torsemide (DEMADEX) 20 MG Tab    spironolactone (ALDACTONE) 50 MG Tab    Benign essential hypertension    Relevant Medications    torsemide (DEMADEX) 20 MG Tab    spironolactone (ALDACTONE) 50 MG Tab    Hyperlipidemia LDL goal <100    Relevant Medications    torsemide (DEMADEX) 20 MG Tab    spironolactone (ALDACTONE) 50 MG Tab    Chronic heart failure with preserved ejection fraction (HFpEF) (HCC) - Primary    Relevant Medications    torsemide (DEMADEX) 20 MG Tab    spironolactone (ALDACTONE) 50 MG Tab    Other Relevant Orders    IRON/TOTAL IRON BIND    FERRITIN    Comp Metabolic Panel    CBC WITH DIFFERENTIAL       Other    ILD (interstitial lung disease) (HCC)    Relevant Medications    torsemide (DEMADEX) 20 MG Tab    Pulmonary hypertension (HCC)    Relevant Medications    torsemide (DEMADEX) 20 MG Tab    spironolactone (ALDACTONE) 50 MG Tab    PFO (patent foramen ovale)    Relevant Medications    torsemide (DEMADEX) 20 MG Tab    spironolactone (ALDACTONE) 50 MG Tab    Chronic hypoxic respiratory failure, on home oxygen therapy (HCC)    Relevant Medications    spironolactone (ALDACTONE) 50 MG Tab    On potassium wasting diuretic therapy    Relevant Orders    Comp Metabolic Panel     Mr. Kraus is overall doing well and stable from a chronic complex endovascular standpoint    Examines relatively euvolemic and well compensated from his chronic HFpEF.  Remains in persistent A-fib. Refilled Bumex and spironolactone.  Advised him to take extra torsemide 20 mg a day if he gains at least  3 pounds a day or 5 pounds a week and to notify me.    No bleeding concerns on apixaban.    Ordered CBC, ferritin and iron studies.  Target ferritin > 200 and/or transferrin saturation > 20%.  Will arrange IV iron accordingly which has benefit in chronic HF patients if needed for iron deficiency.  Has chronic mild anemia.  No bleeding concerns noted.    Ordered CMP for June 2025, to be done with proBNP ordered by Dr. Garcia.      Thank you for the opportunity to be involved in Pablo Kraus 's  complex cardiovascular care; and please reach out with any questions or concerns.     () Today's E/M visit is associated with medical care services that serve as the continuing focal point for all needed health care services and/or with medical care services that  are part of ongoing cardiac care related to a patient's single, serious condition, or a complex condition: This includes  furnishing services to patients on an ongoing basis that result in care that is personalized  to the patient. The services result in a comprehensive, longitudinal, and continuous  relationship with the patient and involve delivery of team-based care that is accessible, coordinated with other practitioners and providers, and integrated with the broader health  care landscape.     Return in about 6 months (around 10/4/2025).    Ge Otoole MD, MPH [facility]  Interventional Cardiologist  [facility]   of Clinical Internal Medicine - [facility]    ~ Portions of this note were completed using voice recognition software (Dragon Naturally speaking software) . Occasional transcription errors may have escaped proof reading. I have made every reasonable attempt to correct obvious errors, but I expect that there are errors of grammar and possibly content that I did not discover before finalizing the note. ~

## 2025-04-05 LAB — FERRITIN SERPL-MCNC: 453 NG/ML (ref 22–322)

## 2025-04-07 ENCOUNTER — RESULTS FOLLOW-UP (OUTPATIENT)
Dept: CARDIOLOGY | Facility: MEDICAL CENTER | Age: 85
End: 2025-04-07

## 2025-04-29 NOTE — Clinical Note
REFERRAL APPROVAL NOTICE         Sent on April 29, 2025                   Pablo Kraus  [address] [place] [address]                   Dear Mr. Kraus,    After a careful review of the medical information and benefit coverage, [facility] has processed your referral. See below for additional details.    If applicable, you must be actively enrolled with your insurance for coverage of the authorized service. If you have any questions regarding your coverage, please contact your insurance directly.    REFERRAL INFORMATION   Referral #:  [id number]  Referred-To Department    Referred-By Provider:  Pulmonary and Sleep Medicine    Jacklyn Garcia M.D.   Pulmonary/sleep [facility]      [address]  [address]  [place] [address]  [phone] [address]  [place] [address] [phone]    Referral Start Date:  03/10/2025  Referral End Date:   03/10/2026           SCHEDULING  If you do not already have an appointment, please call [phone] to make an appointment.   MORE INFORMATION  As a reminder, [facility] ownership has changed, meaning this location is now owned and operated by [facility]. As such, we want to clarify that our patients should expect to receive two separate bills for the services received at [facility] - one representing the [facility] facility fees as the owner of the establishment, and the other to represent the physician's services and subsequent fees. You can speak with your insurance carrier for a pricing estimate by calling the customer service number on the back of your card and ask about charges for a hospital outpatient visit.  If you do not already have a [employer] account, sign up at: [email or web address]  You can access your medical information, make appointments, see lab results, billing information,  and more.  If you have questions regarding this referral, please contact  the [facility] department at:             [phone]. Monday - Friday 7:30AM - 5:00PM.      Sincerely,  [facility]

## 2025-05-21 ENCOUNTER — TELEPHONE (OUTPATIENT)
Dept: SLEEP MEDICINE | Facility: MEDICAL CENTER | Age: 85
End: 2025-05-21
Payer: MEDICARE

## 2025-05-21 NOTE — TELEPHONE ENCOUNTER
VOICEMAIL  1. Caller Name: Pablo                      Call Back Number: [phone]    2. Message: Pt called and lm, asking if Dr Garcia can sign a consent for for his POC for [employer].    3. Patient approves office to leave a detailed voicemail/[employer] message: N\A      5/21/25:  Called and spoke with pt. Pt planning to go to [place] and [employer] is requiring a consent form signed by his provider.   I asked it pt is able to send it through moziy. Pt going to fill out the form and will drop it off at [facility] on Friday Morning.

## 2025-05-23 DIAGNOSIS — I27.20 PULMONARY HYPERTENSION (HCC): ICD-10-CM

## 2025-05-23 RX ORDER — PIRFENIDONE 267 MG/1
3 TABLET, FILM COATED ORAL 3 TIMES DAILY
Qty: 270 TABLET | Refills: 3 | Status: CANCELLED | OUTPATIENT
Start: 2025-05-23

## 2025-05-23 RX ORDER — PIRFENIDONE 267 MG/1
267 TABLET, FILM COATED ORAL
COMMUNITY
End: 2025-05-23 | Stop reason: SDUPTHER

## 2025-05-23 NOTE — TELEPHONE ENCOUNTER
Pt came into the office, requesting a refill on his Esbriet to be sent to [facility]. ( The pharmacy they use is [facility])    Have we ever prescribed this med? Yes.  If yes, what date? 2/14/25    Last OV: 3/10/25 with Dr Garcia     Next OV: 09/02/25 with Timothy AMARAL     DX: ILD    Medications:   Requested Prescriptions     Pending Prescriptions Disp Refills    ESBRIET 267 MG Tab       Sig: Take 267 mg by mouth. Take 3 Tablets by mouth in the morning, at noon, and at bedtime.

## 2025-05-23 NOTE — TELEPHONE ENCOUNTER
[employer] Phys[employer] consent POC form signed/scanned.    Pt is going to  at [facility] today.

## 2025-05-24 RX ORDER — PIRFENIDONE 267 MG/1
267 TABLET, COATED ORAL 3 TIMES DAILY
Qty: 180 TABLET | Refills: 4 | Status: SHIPPED | OUTPATIENT
Start: 2025-05-24

## 2025-05-27 ENCOUNTER — TELEPHONE (OUTPATIENT)
Dept: SLEEP MEDICINE | Facility: MEDICAL CENTER | Age: 85
End: 2025-05-27
Payer: MEDICARE

## 2025-05-27 NOTE — TELEPHONE ENCOUNTER
Received Refill PA request via [facility]  for ESBRIET 267 MG Tab . (Quantity:180 Tablets, Day Supply:60)     Insurance: [employer]  Member ID:  [id number]  BIN: [id number]  PCN: [id number]  Group: [id number]     Ran Test claim via [place] & medication is unable to process as we are not contracted with [facility]. Will go ahead and release script to preferred pharmacy on file.    Mary Broderick [facility]  Central Pharmacy Liaison (Rx Coordinator)  [phone]  5/27/2025 8:06 AM

## 2025-05-27 NOTE — TELEPHONE ENCOUNTER
Called [facility] to make sure they received a rx. They did. Clarified SIG, there was 2 instructions.  Told them pt takes 3 tab TID. Changed Quantity to 270.

## 2025-06-03 ENCOUNTER — PATIENT MESSAGE (OUTPATIENT)
Dept: SLEEP MEDICINE | Facility: MEDICAL CENTER | Age: 85
End: 2025-06-03
Payer: MEDICARE

## 2025-06-03 DIAGNOSIS — I27.20 PULMONARY HYPERTENSION (HCC): ICD-10-CM

## 2025-06-04 RX ORDER — TREPROSTINIL 1.74 MG/2.9ML
INHALANT ORAL
Qty: 348 ML | Refills: 12 | Status: SHIPPED | OUTPATIENT
Start: 2025-06-04

## 2025-06-26 ENCOUNTER — PATIENT MESSAGE (OUTPATIENT)
Dept: CARDIOLOGY | Facility: MEDICAL CENTER | Age: 85
End: 2025-06-26
Payer: MEDICARE

## 2025-06-27 DIAGNOSIS — I50.814 RIGHT HEART FAILURE SECONDARY TO LEFT HEART FAILURE (HCC): ICD-10-CM

## 2025-06-27 DIAGNOSIS — I50.32 CHRONIC HEART FAILURE WITH PRESERVED EJECTION FRACTION (HFPEF) (HCC): Primary | ICD-10-CM

## 2025-07-09 DIAGNOSIS — J96.11 CHRONIC HYPOXIC RESPIRATORY FAILURE, ON HOME OXYGEN THERAPY (HCC): ICD-10-CM

## 2025-07-09 DIAGNOSIS — Z99.81 CHRONIC HYPOXIC RESPIRATORY FAILURE, ON HOME OXYGEN THERAPY (HCC): ICD-10-CM

## 2025-07-09 RX ORDER — SPIRONOLACTONE 50 MG/1
TABLET, FILM COATED ORAL
Qty: 96 TABLET | Refills: 0 | OUTPATIENT
Start: 2025-07-09

## 2025-07-09 RX ORDER — SPIRONOLACTONE 50 MG/1
TABLET, FILM COATED ORAL
Qty: 96 TABLET | Refills: 3 | Status: CANCELLED | OUTPATIENT
Start: 2025-07-09

## 2025-07-10 ENCOUNTER — TELEPHONE (OUTPATIENT)
Dept: CARDIOLOGY | Facility: MEDICAL CENTER | Age: 85
End: 2025-07-10
Payer: MEDICARE

## 2025-07-10 NOTE — TELEPHONE ENCOUNTER
AL    Caller: Pablo Kraus     Topic/issue: Pt states that [facility] is telling him that there are no refills for his spironolactone (ALDACTONE) 50 MG Tab [[id number]] - Pt states that the bottle reflects no refills and he is almost out - Please submit for refill     Callback Number: [phone]    Thank You   Katey LYNCH

## 2025-07-11 ENCOUNTER — HOSPITAL ENCOUNTER (OUTPATIENT)
Facility: MEDICAL CENTER | Age: 85
End: 2025-07-11
Payer: MEDICARE

## 2025-07-11 LAB
ALBUMIN SERPL BCP-MCNC: 4.4 G/DL (ref 3.2–4.9)
ANION GAP SERPL CALC-SCNC: 11 MMOL/L (ref 7–16)
BUN SERPL-MCNC: 51 MG/DL (ref 8–22)
CALCIUM ALBUM COR SERPL-MCNC: 9.3 MG/DL (ref 8.5–10.5)
CALCIUM SERPL-MCNC: 9.6 MG/DL (ref 8.5–10.5)
CHLORIDE SERPL-SCNC: 98 MMOL/L (ref 96–112)
CO2 SERPL-SCNC: 29 MMOL/L (ref 20–33)
CREAT SERPL-MCNC: 1.68 MG/DL (ref 0.5–1.4)
GFR SERPLBLD CREATININE-BSD FMLA CKD-EPI: 39 ML/MIN/1.73 M 2
GLUCOSE SERPL-MCNC: 100 MG/DL (ref 65–99)
PHOSPHATE SERPL-MCNC: 4.2 MG/DL (ref 2.5–4.5)
POTASSIUM SERPL-SCNC: 5.1 MMOL/L (ref 3.6–5.5)
SODIUM SERPL-SCNC: 138 MMOL/L (ref 135–145)

## 2025-07-11 PROCEDURE — 80069 RENAL FUNCTION PANEL: CPT

## 2025-07-11 PROCEDURE — 36415 COLL VENOUS BLD VENIPUNCTURE: CPT

## 2025-07-11 NOTE — TELEPHONE ENCOUNTER
Noted tele encounter below,  ================================  Chart reviewed 4/4/25  Spironolactone 50 mg daily, and 25 mg Mondays, Wednesdays and Fridays.  96 tabs 3/3 refills.    Too soon.     Call to [facility] pharm-pharm tech stated that the prescription is ready for pickup.  =============================  Phone Number Called: [phone]     Call outcome: Spoke to patient regarding message below.    Message: Called to update pt on prescription status.      Pt verbalized understanding.,

## (undated) DEVICE — MANIFOLD NEPTUNE 1 PORT (20/PK)

## (undated) DEVICE — CONTAINER, SPECIMEN, STERILE

## (undated) DEVICE — FILM CASSETTE ENDO

## (undated) DEVICE — SENSOR SPO2 NEO LNCS ADHESIVE (20/BX) SEE USER NOTES

## (undated) DEVICE — TUBING O2 7FT TIP SMTH BORE - (50/CA)

## (undated) DEVICE — TOWEL STOP TIMEOUT SAFETY FLAG (40EA/CA)

## (undated) DEVICE — MASK WITH FACE SHIELD (25/BX 4BX/CA)

## (undated) DEVICE — NEPTUNE 4 PORT MANIFOLD - (20/PK)

## (undated) DEVICE — CANISTER SUCTION RIGID RED 1500CC (40EA/CA)

## (undated) DEVICE — GOWN WARMING STANDARD FLEX - (30/CA)

## (undated) DEVICE — TUBE CONNECTING SUCTION - CLEAR PLASTIC STERILE 72 IN (50EA/CA)

## (undated) DEVICE — CAPTIVATOR II-15MM ROUND STIFF

## (undated) DEVICE — TRAP POLYP E-TRAP (25EA/BX)

## (undated) DEVICE — KIT CUSTOM PROCEDURE SINGLE FOR ENDO  (15/CA)

## (undated) DEVICE — CANISTER SUCTION 3000ML MECHANICAL FILTER AUTO SHUTOFF MEDI-VAC NONSTERILE LF DISP  (40EA/CA)

## (undated) DEVICE — ELECTRODE 850 FOAM ADHESIVE - HYDROGEL RADIOTRNSPRNT (50/PK)